# Patient Record
Sex: FEMALE | Race: WHITE
[De-identification: names, ages, dates, MRNs, and addresses within clinical notes are randomized per-mention and may not be internally consistent; named-entity substitution may affect disease eponyms.]

---

## 2020-01-17 ENCOUNTER — HOSPITAL ENCOUNTER (EMERGENCY)
Dept: HOSPITAL 92 - ERS | Age: 75
Discharge: HOME | End: 2020-01-17
Payer: MEDICARE

## 2020-01-17 DIAGNOSIS — R53.1: ICD-10-CM

## 2020-01-17 DIAGNOSIS — E78.00: ICD-10-CM

## 2020-01-17 DIAGNOSIS — F17.210: ICD-10-CM

## 2020-01-17 DIAGNOSIS — Z79.899: ICD-10-CM

## 2020-01-17 DIAGNOSIS — I12.9: Primary | ICD-10-CM

## 2020-01-17 DIAGNOSIS — E78.5: ICD-10-CM

## 2020-01-17 DIAGNOSIS — N18.9: ICD-10-CM

## 2020-01-17 LAB
ALBUMIN SERPL BCG-MCNC: 2.5 G/DL (ref 3.4–4.8)
ALP SERPL-CCNC: 61 U/L (ref 40–110)
ALT SERPL W P-5'-P-CCNC: 10 U/L (ref 8–55)
ANION GAP SERPL CALC-SCNC: 15 MMOL/L (ref 10–20)
AST SERPL-CCNC: 41 U/L (ref 5–34)
BASOPHILS # BLD AUTO: 0.2 THOU/UL (ref 0–0.2)
BASOPHILS NFR BLD AUTO: 2.7 % (ref 0–1)
BILIRUB SERPL-MCNC: 0.5 MG/DL (ref 0.2–1.2)
BUN SERPL-MCNC: 21 MG/DL (ref 9.8–20.1)
CALCIUM SERPL-MCNC: 7.9 MG/DL (ref 7.8–10.44)
CHLORIDE SERPL-SCNC: 104 MMOL/L (ref 98–107)
CO2 SERPL-SCNC: 20 MMOL/L (ref 23–31)
CREAT CL PREDICTED SERPL C-G-VRATE: 0 ML/MIN (ref 70–130)
EOSINOPHIL # BLD AUTO: 0.3 THOU/UL (ref 0–0.7)
EOSINOPHIL NFR BLD AUTO: 4.4 % (ref 0–10)
GLOBULIN SER CALC-MCNC: 3.1 G/DL (ref 2.4–3.5)
GLUCOSE SERPL-MCNC: 85 MG/DL (ref 83–110)
HGB BLD-MCNC: 11.1 G/DL (ref 12–16)
LYMPHOCYTES # BLD: 1 THOU/UL (ref 1.2–3.4)
LYMPHOCYTES NFR BLD AUTO: 15.5 % (ref 21–51)
MACROCYTES BLD QL SMEAR: (no result) (100X)
MCH RBC QN AUTO: 35.8 PG (ref 27–31)
MCV RBC AUTO: 108 FL (ref 78–98)
MDIFF COMPLETE?: YES
MONOCYTES # BLD AUTO: 0.3 THOU/UL (ref 0.11–0.59)
MONOCYTES NFR BLD AUTO: 5 % (ref 0–10)
NEUTROPHILS # BLD AUTO: 4.9 THOU/UL (ref 1.4–6.5)
NEUTROPHILS NFR BLD AUTO: 72.5 % (ref 42–75)
PLATELET # BLD AUTO: 86 THOU/UL (ref 130–400)
POTASSIUM SERPL-SCNC: 3.5 MMOL/L (ref 3.5–5.1)
RBC # BLD AUTO: 3.09 MILL/UL (ref 4.2–5.4)
SODIUM SERPL-SCNC: 135 MMOL/L (ref 136–145)
WBC # BLD AUTO: 6.7 THOU/UL (ref 4.8–10.8)

## 2020-01-17 PROCEDURE — 85025 COMPLETE CBC W/AUTO DIFF WBC: CPT

## 2020-01-17 PROCEDURE — 83880 ASSAY OF NATRIURETIC PEPTIDE: CPT

## 2020-01-17 PROCEDURE — 36415 COLL VENOUS BLD VENIPUNCTURE: CPT

## 2020-01-17 PROCEDURE — 81003 URINALYSIS AUTO W/O SCOPE: CPT

## 2020-01-17 PROCEDURE — 80053 COMPREHEN METABOLIC PANEL: CPT

## 2020-01-17 PROCEDURE — 84484 ASSAY OF TROPONIN QUANT: CPT

## 2020-01-17 PROCEDURE — 71045 X-RAY EXAM CHEST 1 VIEW: CPT

## 2020-01-17 PROCEDURE — 93005 ELECTROCARDIOGRAM TRACING: CPT

## 2020-01-17 NOTE — RAD
XR Chest 1 View Portable



HISTORY: Weakness and lethargy



COMPARISON: 9/2/2016



FINDINGS: The heart size is normal. The lungs are well expanded without focal areas of consolidation,
 pneumothorax or pleural effusions.



IMPRESSION: No radiographic evidence of acute cardiopulmonary process.



Reported By: Maximiliano Meyer 

Electronically Signed:  1/17/2020 7:56 AM

## 2020-01-23 ENCOUNTER — HOSPITAL ENCOUNTER (INPATIENT)
Dept: HOSPITAL 92 - ERS | Age: 75
LOS: 15 days | DRG: 840 | End: 2020-02-07
Attending: FAMILY MEDICINE | Admitting: FAMILY MEDICINE
Payer: MEDICARE

## 2020-01-23 ENCOUNTER — HOSPITAL ENCOUNTER (EMERGENCY)
Dept: HOSPITAL 9 - MADERS | Age: 75
Discharge: ANOTHER HOSPITAL | End: 2020-01-23
Payer: MEDICARE

## 2020-01-23 VITALS — BODY MASS INDEX: 24.3 KG/M2

## 2020-01-23 DIAGNOSIS — R59.1: ICD-10-CM

## 2020-01-23 DIAGNOSIS — E78.5: ICD-10-CM

## 2020-01-23 DIAGNOSIS — Z51.5: ICD-10-CM

## 2020-01-23 DIAGNOSIS — C50.919: ICD-10-CM

## 2020-01-23 DIAGNOSIS — D69.6: ICD-10-CM

## 2020-01-23 DIAGNOSIS — E87.2: ICD-10-CM

## 2020-01-23 DIAGNOSIS — Z87.891: ICD-10-CM

## 2020-01-23 DIAGNOSIS — A41.9: ICD-10-CM

## 2020-01-23 DIAGNOSIS — R62.7: ICD-10-CM

## 2020-01-23 DIAGNOSIS — J96.01: ICD-10-CM

## 2020-01-23 DIAGNOSIS — N18.3: ICD-10-CM

## 2020-01-23 DIAGNOSIS — D63.1: ICD-10-CM

## 2020-01-23 DIAGNOSIS — I95.9: ICD-10-CM

## 2020-01-23 DIAGNOSIS — I62.00: ICD-10-CM

## 2020-01-23 DIAGNOSIS — F17.210: ICD-10-CM

## 2020-01-23 DIAGNOSIS — Z90.710: ICD-10-CM

## 2020-01-23 DIAGNOSIS — R53.81: ICD-10-CM

## 2020-01-23 DIAGNOSIS — Z79.899: ICD-10-CM

## 2020-01-23 DIAGNOSIS — I12.9: ICD-10-CM

## 2020-01-23 DIAGNOSIS — N17.9: ICD-10-CM

## 2020-01-23 DIAGNOSIS — I21.4: Primary | ICD-10-CM

## 2020-01-23 DIAGNOSIS — D53.9: ICD-10-CM

## 2020-01-23 DIAGNOSIS — E43: ICD-10-CM

## 2020-01-23 DIAGNOSIS — C85.11: Primary | ICD-10-CM

## 2020-01-23 DIAGNOSIS — I10: ICD-10-CM

## 2020-01-23 DIAGNOSIS — E87.6: ICD-10-CM

## 2020-01-23 DIAGNOSIS — J15.6: ICD-10-CM

## 2020-01-23 DIAGNOSIS — Z79.01: ICD-10-CM

## 2020-01-23 DIAGNOSIS — I48.0: ICD-10-CM

## 2020-01-23 LAB
ALBUMIN SERPL BCG-MCNC: 2.8 G/DL (ref 3.4–4.8)
ALP SERPL-CCNC: 65 U/L (ref 40–110)
ALT SERPL W P-5'-P-CCNC: 17 U/L (ref 8–55)
ANION GAP SERPL CALC-SCNC: 22 MMOL/L (ref 10–20)
AST SERPL-CCNC: 49 U/L (ref 5–34)
BACTERIA UR QL AUTO: (no result) HPF
BILIRUB SERPL-MCNC: 0.5 MG/DL (ref 0.2–1.2)
BUN SERPL-MCNC: 29 MG/DL (ref 9.8–20.1)
CALCIUM SERPL-MCNC: 8.9 MG/DL (ref 7.8–10.44)
CHLORIDE SERPL-SCNC: 101 MMOL/L (ref 98–107)
CK MB SERPL-MCNC: 1.8 NG/ML (ref 0–6.6)
CK MB SERPL-MCNC: 2.1 NG/ML (ref 0–6.6)
CO2 SERPL-SCNC: 16 MMOL/L (ref 23–31)
CREAT CL PREDICTED SERPL C-G-VRATE: 0 ML/MIN (ref 70–130)
GLOBULIN SER CALC-MCNC: 3.1 G/DL (ref 2.4–3.5)
GLUCOSE SERPL-MCNC: 99 MG/DL (ref 83–110)
HGB BLD-MCNC: 11.6 G/DL (ref 12–16)
INR PPP: 1
MACROCYTES BLD QL SMEAR: (no result) (100X)
MAGNESIUM SERPL-MCNC: 1.9 MG/DL (ref 1.6–2.6)
MCH RBC QN AUTO: 33.2 PG (ref 27–31)
MCV RBC AUTO: 104.1 FL (ref 78–98)
MDIFF COMPLETE?: YES
PLATELET # BLD AUTO: 71 THOU/UL (ref 130–400)
POLYCHROMASIA BLD QL SMEAR: (no result) (100X)
POTASSIUM SERPL-SCNC: 4.1 MMOL/L (ref 3.5–5.1)
PROT UR STRIP.AUTO-MCNC: 30 MG/DL
PROTHROMBIN TIME: 13.5 SEC (ref 12–14.7)
RBC # BLD AUTO: 3.48 MILL/UL (ref 4.2–5.4)
SODIUM SERPL-SCNC: 135 MMOL/L (ref 136–145)
WBC # BLD AUTO: 9.4 THOU/UL (ref 4.8–10.8)
WBC UR QL AUTO: (no result) HPF (ref 0–3)

## 2020-01-23 PROCEDURE — 80048 BASIC METABOLIC PNL TOTAL CA: CPT

## 2020-01-23 PROCEDURE — 96361 HYDRATE IV INFUSION ADD-ON: CPT

## 2020-01-23 PROCEDURE — 82553 CREATINE MB FRACTION: CPT

## 2020-01-23 PROCEDURE — 70450 CT HEAD/BRAIN W/O DYE: CPT

## 2020-01-23 PROCEDURE — 82274 ASSAY TEST FOR BLOOD FECAL: CPT

## 2020-01-23 PROCEDURE — 36416 COLLJ CAPILLARY BLOOD SPEC: CPT

## 2020-01-23 PROCEDURE — 85610 PROTHROMBIN TIME: CPT

## 2020-01-23 PROCEDURE — 93306 TTE W/DOPPLER COMPLETE: CPT

## 2020-01-23 PROCEDURE — 83735 ASSAY OF MAGNESIUM: CPT

## 2020-01-23 PROCEDURE — 80053 COMPREHEN METABOLIC PANEL: CPT

## 2020-01-23 PROCEDURE — 84443 ASSAY THYROID STIM HORMONE: CPT

## 2020-01-23 PROCEDURE — 85025 COMPLETE CBC W/AUTO DIFF WBC: CPT

## 2020-01-23 PROCEDURE — 74177 CT ABD & PELVIS W/CONTRAST: CPT

## 2020-01-23 PROCEDURE — A9579 GAD-BASE MR CONTRAST NOS,1ML: HCPCS

## 2020-01-23 PROCEDURE — 82378 CARCINOEMBRYONIC ANTIGEN: CPT

## 2020-01-23 PROCEDURE — 84484 ASSAY OF TROPONIN QUANT: CPT

## 2020-01-23 PROCEDURE — 71045 X-RAY EXAM CHEST 1 VIEW: CPT

## 2020-01-23 PROCEDURE — 94640 AIRWAY INHALATION TREATMENT: CPT

## 2020-01-23 PROCEDURE — 36415 COLL VENOUS BLD VENIPUNCTURE: CPT

## 2020-01-23 PROCEDURE — 85007 BL SMEAR W/DIFF WBC COUNT: CPT

## 2020-01-23 PROCEDURE — C9113 INJ PANTOPRAZOLE SODIUM, VIA: HCPCS

## 2020-01-23 PROCEDURE — 83880 ASSAY OF NATRIURETIC PEPTIDE: CPT

## 2020-01-23 PROCEDURE — 83605 ASSAY OF LACTIC ACID: CPT

## 2020-01-23 PROCEDURE — 96374 THER/PROPH/DIAG INJ IV PUSH: CPT

## 2020-01-23 PROCEDURE — 96372 THER/PROPH/DIAG INJ SC/IM: CPT

## 2020-01-23 PROCEDURE — 82533 TOTAL CORTISOL: CPT

## 2020-01-23 PROCEDURE — 86901 BLOOD TYPING SEROLOGIC RH(D): CPT

## 2020-01-23 PROCEDURE — 85060 BLOOD SMEAR INTERPRETATION: CPT

## 2020-01-23 PROCEDURE — 93005 ELECTROCARDIOGRAM TRACING: CPT

## 2020-01-23 PROCEDURE — 88184 FLOWCYTOMETRY/ TC 1 MARKER: CPT

## 2020-01-23 PROCEDURE — 87040 BLOOD CULTURE FOR BACTERIA: CPT

## 2020-01-23 PROCEDURE — 86300 IMMUNOASSAY TUMOR CA 15-3: CPT

## 2020-01-23 PROCEDURE — 86900 BLOOD TYPING SEROLOGIC ABO: CPT

## 2020-01-23 PROCEDURE — 81001 URINALYSIS AUTO W/SCOPE: CPT

## 2020-01-23 PROCEDURE — 74018 RADEX ABDOMEN 1 VIEW: CPT

## 2020-01-23 PROCEDURE — 36430 TRANSFUSION BLD/BLD COMPNT: CPT

## 2020-01-23 PROCEDURE — 82607 VITAMIN B-12: CPT

## 2020-01-23 PROCEDURE — 87086 URINE CULTURE/COLONY COUNT: CPT

## 2020-01-23 PROCEDURE — 82140 ASSAY OF AMMONIA: CPT

## 2020-01-23 PROCEDURE — 84145 PROCALCITONIN (PCT): CPT

## 2020-01-23 PROCEDURE — 85027 COMPLETE CBC AUTOMATED: CPT

## 2020-01-23 PROCEDURE — 82746 ASSAY OF FOLIC ACID SERUM: CPT

## 2020-01-23 PROCEDURE — 70553 MRI BRAIN STEM W/O & W/DYE: CPT

## 2020-01-23 PROCEDURE — P9047 ALBUMIN (HUMAN), 25%, 50ML: HCPCS

## 2020-01-23 PROCEDURE — 84100 ASSAY OF PHOSPHORUS: CPT

## 2020-01-23 PROCEDURE — 84439 ASSAY OF FREE THYROXINE: CPT

## 2020-01-23 PROCEDURE — 86850 RBC ANTIBODY SCREEN: CPT

## 2020-01-23 PROCEDURE — P9016 RBC LEUKOCYTES REDUCED: HCPCS

## 2020-01-23 PROCEDURE — 81003 URINALYSIS AUTO W/O SCOPE: CPT

## 2020-01-23 PROCEDURE — 71250 CT THORAX DX C-: CPT

## 2020-01-23 PROCEDURE — 81015 MICROSCOPIC EXAM OF URINE: CPT

## 2020-01-23 PROCEDURE — P9035 PLATELET PHERES LEUKOREDUCED: HCPCS

## 2020-01-23 NOTE — CT
Exam: Head CT without contrast



HISTORY: Intracranial hemorrhage. Status post fall.



COMPARISON: 1/23/2020



FINDINGS:

Hemorrhage: Redemonstration of an extra-axial hematoma along the left frontal convexity, unchanged an
d measuring 0.7 cm. Overlying scalp hematoma is noted. No new parenchymal hemorrhage.

Brain parenchyma: Stable atrophy. Cortical gray-white matter differentiation is preserved.Chronic sma
ll vessel ischemic changes of the white matter are redemonstrated.

Ventricular system: Stable

Calvarium: Intact.

Sinuses and mastoid air cells: Unchanged



IMPRESSION:

Essentially stable small extra-axial hematoma along the left frontal convexity. No significant change
 in size. No evidence of new intracranial hemorrhage. Continued surveillance is recommended.







Transcribed Date/Time: 1/23/2020 11:28 PM



Reported By: Bibi Maher 

Electronically Signed:  1/23/2020 11:48 PM

## 2020-01-23 NOTE — RAD
CHEST ONE VIEW:

1/23/20

 

INDICATION:

History of cough. 

 

COMPARISON: 

Prior exam dated 1/17/20.

 

FINDINGS: 

The heart size is normal. Vascular calcifications of the aortic arch is similar in appearance. Pulmon
jed vasculature is within normal limits. There is a new small right pleural effusion. No acute osseou
s abnormality evident.

 

IMPRESSION: 

New small right pleural effusion. No clover consolidation evident. 

 

POS: [facility]

## 2020-01-23 NOTE — CT
Exam: Chest CT without contrast

Abdomen CT without contrast

Pelvic CT without contrast



HISTORY: Diffuse epigastric pain, nausea and abdominal distention. Supraclavicular lymph nodes. Dyspn
ea



COMPARISON: Abdomen pelvis CT 5/24/2017



FINDINGS:

Chest CT:

Limited evaluation of the mediastinum due to the lack of IV contrast. Enlarged right paratracheal lym
ph node measures 1.9 x 0.8 cm. No masses or hematomas. Heart size is normal. No significant

pericardial fluid.

Atherosclerosis of the visualized thoracic and abdominal aorta. No periaortic fat stranding.

There is a large, necrotic right supraclavicular lymph node measuring 2.4 x 4.1 cm. Additional right 
axillary lymph nodes are noted. Representative lymph node measures 1.6 x 1.4 cm. No significant

left axillary lymphadenopathy. There does appear to be diffuse edema involving the right breast. Karol
elate for possible inflammatory breast cancer.

Trachea and central bronchi are patent. Small right-sided pleural effusion. Dependent atelectatic elizabeth
nges in the right lower lobe are suspected. Component of aspiration or pneumonia cannot be

excluded. No suspicious masses, nodules or consolidation. In the left lung, nonspecific groundglass o
pacities in the apex. Linear opacities in the left lower lobe may represent areas of scar or

atelectasis. Ill-defined nodule in the left lower lobe measures 0.6 x 0.3 cm.

Questionable right hilar lymphadenopathy measuring 1.4 x 1.2 cm.



Abdomen CT: 

Limited evaluation of the solid organs by the lack of IV contrast. Grossly the liver, spleen, pancrea
s and left adrenal gland is unremarkable. Possible hyperdense nodule involving the right adrenal

gland measuring 0.9 cm with an attenuation coefficient of 36 Hounsfield units. There does appear to b
e multiple lymph nodes along the gastrohepatic ligament. Representative lymph node measures 0.8 x

1.3 cm. Additional peripancreatic lymph nodes may be present. There appears to be lymphadenopathy ant
erior to the aorta and posterior to the pancreas, measuring 3.0 x 1.9 cm

Peripherally calcified exophytic hypodense lesion in the right kidney, measures 1.8 cm. Bilaterally n
o obstructive uropathy.

There is perihepatic and perisplenic fluid. There is fluid in both paracolic gutters and mild edema t
hroughout the mesentery. No evidence of free air.

Limited evaluation of the alimentary canal by the lack of oral contrast. No evidence of bowel obstruc
tion. Limited evaluation of the ileocecal junction. Normal caliber appendix. Ileocecal junction is

unremarkable. Limited evaluation of the colon due to inadequate distention. Mucosal thickening may be
 due to inadequate distention. Nonemergent colonoscopy is recommended. There is diverticulosis,

without evidence of diverticulitis.



Pelvic CT: 

There is complex fluid in the pelvis. Uterus is surgically absent. Urinary bladder is unremarkable. T
here is evidence of edema in the soft tissues, worrisome for anasarca in the thorax and abdomen and

pelvis. There appears be lymphadenopathy along the right common iliac chain measuring 1.3 x 2.4 cm. A
dditional lymphadenopathy along the right external iliac chain is noted.



Osseous structures: 

No definite osteolytic or osteoblastic lesions.



IMPRESSION:

1. Extensive lymphadenopathy in the chest, abdomen and pelvis as described above.

2. Edematous change in the right breast. Correlate for inflammatory breast cancer.

3. Small right-sided pleural effusion.



Results of the head CT, chest/abdomen and pelvic CT discussed with Dr. Chester, 1/23/2020 at 7:54 PM



Code CR



Transcribed Date/Time: 1/23/2020 8:05 PM



Reported By: Bibi Maher 

Electronically Signed:  1/23/2020 8:05 PM

## 2020-01-23 NOTE — CT
Exam: Head CT without contrast





HISTORY: Dizziness and weakness, x2 weeks.



COMPARISON: none





FINDINGS:

Hemorrhage: 0.7 cm extra-axial hematoma along the left frontal convex city. No parenchymal hemorrhage
. Minimal mass effect upon the left frontal lobe.

Brain parenchyma: Cortical gray-white matter differentiation is preserved. No significant mass effect
 or midline shift. Basilar cisterns are patent.Chronic small vessel ischemic changes white matter.

Remote lacunar infarct in the left thalamus.

Ventricular system: Ventricles and sulci are patent and symmetric.

Calvarium: Intact. Large left frontal scalp hematoma.

Sinuses and mastoid air cells: Adequate aeration.



IMPRESSION:



1. Left scalp hematoma. No calvarial fracture

2. Left frontal extra-axial hematoma with minimal mass effect upon the left frontal lobe.







Reported By: Bibi Maher 

Electronically Signed:  1/23/2020 7:44 PM

## 2020-01-24 RX ADMIN — Medication SCH: at 21:08

## 2020-01-24 NOTE — PDOC.HOSPP
- Subjective


Subjective: 


Lying down comfortably in bed. Denies any headache, visual changes, focal 

changes. States lymph nodes started becoming enlarged on neck for the last 

couple of weeks.





- Objective


Vital Signs & Weight: 


 Vital Signs (12 hours)











  Temp


 


 01/24/20 08:00  98.1 F


 


 01/24/20 04:00  98.0 F


 


 01/24/20 02:15  97.7 F








 Weight











Weight                         145 lb 4.554 oz











 Most Recent Monitor Data











Heart Rate from ECG            67


 


NIBP                           106/63


 


NIBP BP-Mean                   77


 


Respiration from ECG           21


 


SpO2                           96














I&O: 


 











 01/23/20 01/24/20 01/25/20





 06:59 06:59 06:59


 


Intake Total  295 280


 


Output Total  0 380


 


Balance  295 -100











Additional Labs: 





 Laboratory Results











  01/23/20





  22:54


 


CK-MB (CK-2)  2.1


 


Troponin I  0.122 H











Radiology Reviewed by me: Yes


EKG Reviewed by me: Yes





Hospitalist ROS





- Review of Systems


Constitutional: denies: fever, chills, sweats, weakness, malaise, other


Eyes: denies: pain, vision change, conjunctivae inflammation, eyelid 

inflammation, redness, other


ENT: denies: ear pain, ear discharge, nose pain, nose discharge, nose congestion

, mouth pain, mouth swelling, throat pain, throat swelling, other


Respiratory: denies: cough, dry, shortness of breath, hemoptysis, SOB with 

excertion, pleuritic pain, sputum, wheezing, other


Cardiovascular: denies: chest pain, palpitations, orthopnea, paroxysmal noc. 

dyspnea, edema, light headedness, other


Gastrointestinal: denies: nausea, vomiting, abdominal pain, diarrhea, 

constipation, melena, hematochezia, other


Genitourinary: denies: dysuria, frequency, incontinence, hematuria, retention, 

other


Musculoskeletal: denies: neck pain, shoulder pain, arm pain, back pain, hand 

pain, leg pain, foot pain, other





- Medication


Medications: 


Active Medications











Generic Name Dose Route Start Last Admin





  Trade Name Freq  PRN Reason Stop Dose Admin


 


Famotidine  20 mg  01/24/20 09:00  01/24/20 09:25





  Pepcid  PO   20 mg





  QAM TINO   Administration





     





     





     





     


 


Sodium Chloride  1,000 mls @ 100 mls/hr  01/24/20 03:35  01/24/20 03:57





  Normal Saline 0.9%  IV   Not Given





  .Q10H [facility]   





     





     





     





     








 











 Medication  Instructions  Recorded  Confirmed  Type


 


Amlodipine Besylate [amLODIPine 10 mg PO DAILY 09/02/16 01/24/20 History





Besylate]    


 


Lovastatin 20 mg PO QAM-WM 09/02/16 01/24/20 History


 


Metoprolol Tartrate 25 mg PO BID 09/02/16 01/24/20 History


 


cloNIDine HCl 0.2 mg PO BID 09/02/16 01/24/20 History


 


Omega-3 Fatty Acids/Fish Oil [Fish 1 cap PO DAILY 01/24/20 01/24/20 History





Oil 1,000 mg Capsule]    


 


hydrALAZINE [Apresoline] 25 mg PO TID 01/24/20 01/24/20 History














- Exam


General Appearance: NAD, awake alert


Eye: PERRL, anicteric sclera


ENT: normocephalic atraumatic, no oropharyngeal lesions, moist mucosa


Neck: supple, symmetric, no JVD, no thyromegaly, no carotid bruit


Neck - other findings: Palpable bilateraly lymphadenopathy in cervical superior 

and posterior radha


Heart: RRR, no murmur, no gallops, no rubs, normal peripheral pulses


Respiratory: CTAB, no wheezes, no rales, no ronchi, normal chest expansion, no 

tachypnea, normal percussion


Gastrointestinal: soft, non-tender, non-distended, normal bowel sounds, no 

palpable masses, no hepatomegaly, no splenomegaly, no bruit


Extremities: no cyanosis, no clubbing, no edema


Skin: normal turgor, no lesions, no rashes


Neurological: cranial nerve grossly intact, normal sensation to touch, no 

weakness, no focal deficits, no new deficit


Musculoskeletal: normal tone, normal strength, no muscle wasting


Psychiatric: normal affect, normal behavior, A&O x 3





Hosp A/P


(1) Subdural hemorrhage


Code(s): I62.00 - NONTRAUMATIC SUBDURAL HEMORRHAGE, UNSPECIFIED   Status: Acute

   





(2) Elevated troponin


Code(s): R79.89 - OTHER SPECIFIED ABNORMAL FINDINGS OF BLOOD CHEMISTRY   Status

: Acute   


Plan: 


Likely related to renal failure, no active chest pain at this time








(3) Lymphadenopathy


Code(s): R59.1 - GENERALIZED ENLARGED LYMPH NODES   Status: Acute   


Plan: 


New onset for the past few weeks, located along chest, abdomen, and cervical 

chains (CT RECORD IN THE CHART)








(4) Acute kidney injury superimposed on CKD


Code(s): N17.9 - ACUTE KIDNEY FAILURE, UNSPECIFIED; N18.9 - CHRONIC KIDNEY 

DISEASE, UNSPECIFIED   Status: Acute   





(5) Anemia in chronic kidney disease


Code(s): N18.9 - CHRONIC KIDNEY DISEASE, UNSPECIFIED; D63.1 - ANEMIA IN CHRONIC 

KIDNEY DISEASE   Status: Acute   





(6) Hypertension


Code(s): I10 - ESSENTIAL (PRIMARY) HYPERTENSION   Status: Chronic   





(7) Atrial fibrillation with RVR


Code(s): I48.91 - UNSPECIFIED ATRIAL FIBRILLATION   Status: Acute   





- Plan


Outside Ct scan for Gretchen reviewed by me, shows extensive lymphadenopathy in 

the chest, abdomen, and pelvis. Will see if this report can be uploaded to our 

record system


There is mention of edematous change in the right breast, which is concerning 

for inflammatory breast CA.


CT head showsing extra-axial 0.7cm hematoma


Pending further evaluation by oncology and neurosurgery.


Will likely need some sort lymph node biopsy, but given her recent hematoma on 

CT head will wait and continue to monitor


Resume home amlodipine


Continue IVF


Digoxin given for afib, consult to cardiology for further reccs.





Disposition: Continue monitoring in CCU. Appreciate reccs from neurosurg and 

oncology.

## 2020-01-24 NOTE — CON
DATE OF CONSULTATION:  01/24/2020



This is a 70 minutes time, of that time, greater than 50% was spent with the patient

and/or the patient's unit in the hospital. 



HISTORY OF PRESENT ILLNESS:  This is a 74-year-old female, who was admitted

yesterday with increasing weakness and had a knot on her left temporal area.  She

does not remember falling and actually does not have any bruises around the area.

She says she has been feeling weak for 2 to 3 weeks.  She had a CT scan done showing

diffuse adenopathy in her abdomen, a possible suppurative breast mass on the right. 



PAST MEDICAL HISTORY:  

1. Chronic kidney disease.

2. Ischemic colitis.

3. Deconditioning.

4. Diverticulosis.

5. Hyperlipidemia.

6. Hypertension.



PAST SURGICAL HISTORY:  Hysterectomy.



MEDICATIONS:  Prior to admission; 

1. Amlodipine.

2. Klonopin.

3. Lovastatin.

4. Metoprolol.

5. Protonix.



ALLERGIES:  ACE INHIBITORS.



FAMILY MEDICAL HISTORY:  Unremarkable.



SOCIAL HISTORY:  Quit smoking 2 weeks ago.  Has history of marijuana and

methamphetamine use in the past, not currently using. 



REVIEW OF SYSTEMS:  Remarkable for weakness.  No nausea, vomiting, hematemesis,

melena, hematochezia, hematuria, or dysuria. 



PHYSICAL EXAMINATION:

VITAL SIGNS:  Temperature 98.1, pulse 70, blood pressure 106/63, and O2 saturation

96%. 

HEENT:  She has a golf ball size knot in left occipital-temporal region. 

NECK:  No adenopathy or JVD.  Pupils are reactive.  Tongue midline. 

LUNGS:  Clear. 

CARDIAC:  S1 and S2.  Regular. 

ABDOMEN:  Soft. 

EXTREMITIES:  No edema.  Moves all 4 extremities without difficulty.



LABORATORY DATA:  Troponin 0.122.  White blood cell count 9.4, hematocrit 36.2, and

platelet count 71.  INR 1.0.  Sodium 135, potassium 4.1, chloride 101, CO2 of 16,

anion gap 18, BUN 29, creatinine 2.1, glucose 99, AST 49, and ALT 17.  Troponin

0.122.  Albumin 2.8.  Urinalysis shows some ketones in the urine. 



ASSESSMENT:  

1. Possible fall with injury to the head with small subdural hematoma.

2. Diffuse lymphadenopathy.

3. Thrombocytopenia.

4. Mild metabolic acidosis.

5. Severe protein-calorie malnutrition.



PLAN:  Basically supportive with further workup per Internal Medicine.  From my

standpoint, she can transfer out to the floor.  No pulmonary recommendations at this

time. 







Job ID:  [id number]

## 2020-01-24 NOTE — CON
DATE OF CONSULTATION:  



HISTORY OF PRESENT ILLNESS:  A 74-year-old female presented with a history of

several weeks of increasing weakness, fatigue, and weight loss.  In the outside

emergency department, she was found to have diffuse lymphadenopathy with concern for

inflammatory breast cancer found on CT.  She was additionally noted to have a

subdural collection on head CT, and Neurosurgery has been consulted.  They seem to

believe this is not a subdural hematoma and is rather metastatic changes.  We were

consulted for management of atrial fibrillation.  Today, the patient has been in

atrial fibrillation with RVR with rate up to 138 recorded.  She is currently in

normal sinus rhythm and seems to fluctuate.  Current rate is 70.  She was given one

dose of digoxin earlier today in addition to a 500 mL bolus.  The patient denies any

feelings of palpitations, chest pain, nausea, vomiting, difficulty breathing, or

shortness of breath.  She notes feeling tired and weak. 



PAST MEDICAL HISTORY:  Difficult to obtain from patient, but on review of medical

records, 

1. Hypertension.

2. Hyperlipidemia.

3. Deconditioning.

4. Chronic kidney disease.

5. History of ischemic colitis.

6. Peripheral vascular disease.



SURGICAL HISTORY:  Hysterectomy.



ALLERGIES:  ACE INHIBITORS.



FAMILY HISTORY:  Unremarkable.



SOCIAL HISTORY:  Prior smoker, quit 3 weeks ago.  Former marijuana and

methamphetamine use.  Denies alcohol use. 



REVIEW OF SYSTEMS:  Noted as above, reviewed and otherwise negative.



PHYSICAL EXAMINATION:

VITAL SIGNS:  Blood pressure 87/54, heart rate 70, respiratory rate 20, O2

saturation 95% on room air. 

GENERAL:  Well-developed woman, in no acute distress, weak. 

HEENT:  Normocephalic, atraumatic, lymphadenopathy palpable. 

HEART:  Regular rate and rhythm, no murmur. 

RESPIRATORY:  Clear to auscultation bilaterally. 

EXTREMITIES:  No peripheral edema or cyanosis.



LABORATORY:  BNP of 251, troponin 0.122, creatinine 2.11.



ASSESSMENT AND PLAN:  Paroxysmal atrial fibrillation.  The patient appears to be

intermittently going in and out of atrial fibrillation.  Continue metoprolol b.i.d.

In addition, we will add Multaq twice daily as well.  I will continue to monitor for

hypotension.  Echocardiogram has just been completed and will be reviewed.  In

regard to anticoagulation, we will not start at this time considering questionable

subdural collection.  We will continue to follow the patient throughout

hospitalization. 



This patient was seen and evaluated by Dr. Wise and is in agreement and plan as

noted above. 







Job ID:  [id number]

## 2020-01-24 NOTE — CON
DATE OF CONSULTATION:  



REASON FOR CONSULTATION:  Atrial fibrillation.



HISTORY OF PRESENT ILLNESS:  Please refer to Dr. Tessie Cedillo's for full

consultation for details.  Briefly, Ms. Gleason re-presented with weakness and

fatigue.  She was found to have likely dural mass present.  This was initially felt

to be consistent with a subdural hematoma, but was refuted by Neurosurgery. 



She was also found to have significant lymphadenopathy and likely breast cancer.

She has developed paroxysmal atrial fibrillation.  During my visit, she was in sinus

rhythm. 



PHYSICAL EXAMINATION:

GENERAL:  Patient is a pleasant woman, who is in no acute distress.  The patient

appears her stated age. 

VITAL SIGNS:  Blood pressure 96/56, pulse 68, temperature afebrile. 

NEUROLOGIC:  The patient is alert and oriented x3 with no focal neurologic deficits. 

HEENT:  Sclerae without icterus.  Mouth has moist mucous membranes with normal

pallor. 

NECK:  No JVD.  Carotid upstroke brisk.  No bruits bilaterally. 

LUNGS:  Clear to auscultation with unlabored respirations. 

BACK:  No scoliosis or kyphosis. 

CARDIAC:  Regular rate and rhythm with normal S1 and S2.  No S3 or S4 noted.  No

significant rubs, murmurs, thrills, or gallops noted throughout the precordium.  PMI

is not displaced.  There is no parasternal heave. 

ABDOMEN:  Soft, nontender, nondistended.  No peritoneal signs present.  No

hepatosplenomegaly.  No abnormal striae. 

EXTREMITIES:  2+ femoral and 2+ dorsalis pedis pulses.  No cyanosis, clubbing, or

edema. 

SKIN:  No gross abnormalities.



LABORATORY DATA:  Peak troponin 0.12.



IMPRESSION:  

1. Paroxysmal atrial fibrillation.

2. Widespread cancer of unknown etiology, likely breast.

3. Elevated troponin.



RECOMMENDATIONS:  Ms. Gleason's increased troponin likely secondary to demand

ischemia.  This is a type 2 MI.  We would recommend Multaq 400 mg p.o. b.i.d.  We

will hold anticoagulation therapy until further discussions with Dr. Wallace.

Prognosis does appear poor given the above. 







Job ID:  [id number]

## 2020-01-24 NOTE — CON
DATE OF CONSULTATION:  1/24/2020



REASON FOR CONSULT:  Lymphadenopathy and possible breast cancer.



HISTORY OF PRESENT ILLNESS:  Ms. Gleason is a pleasant 74-year-old female, who

presented to the [place] ER with generalized weakness.  She underwent a 
chest,

abdomen, and pelvis in the ER.  She had an enlarged right paratracheal lymph 
node

measuring 1.9 x 0.8 cm.  There was a right necrotic supraclavicular lymph node

measuring 2.4 x 4.1 cm.  She had right axillary lymph nodes.  She had diffuse 
edema

involving her right breast.  She had a small left lower lobe nodule, some right

hilar lymphadenopathy.  She also had lymphadenopathy throughout her abdomen and

pelvis.  She underwent a brain CT as she had a palpable nodule on her left 
scalp.

This was a small extra-axial hematoma along the left frontal convexity.  She was

admitted here for hematoma and generalized weakness.  Neurosurgery was 
consulted.

The patient states she did hit her head six or seven months ago on a corner of 
her

bed, and she thinks that is where her knot on her head came from.  She admits 
that

her breast has been hurting her for the last month or so.  She quit smoking 
three

weeks ago because she did not like the taste of it any longer.  She states she 
has

lost about 7 pounds in the last several months.  Her last mammogram was five 
years

ago.  She has had a colonoscopy in the past, she does not remember how long ago 
that

was.  The patient is in the ICU on evaluation.  She denies any complaints at 
this

time other than weakness. 



PAST MEDICAL HISTORY:  

1. Chronic kidney disease.

2. History of colitis.

3. Diverticulitis.

4. Hyperlipidemia.

5. Hypertension.

6. History of alcohol use.

7. History of tobacco use.



PAST SURGICAL HISTORY:  Hysterectomy.



ALLERGIES:  TO ACE INHIBITORS.



HOME MEDICATIONS:  

1. Amlodipine.

2. Clonidine.

3. Hydralazine.

4. Lovastatin.

5. Metoprolol.



FAMILY HISTORY:  No history of cancer in her immediate family.



SOCIAL HISTORY:  Single, has 4 children.  She says she is estranged from them.  
Quit

smoking three weeks ago.  Quit drinking alcohol 3 years ago.  No illicit drug 
use. 



REVIEW OF SYSTEMS:  A 10-point review of systems is negative except for noted 
in HPI.



PHYSICAL EXAMINATION:

VITAL SIGNS:  Temperature is 98.0, pulse is 70, respiratory rate 20, blood 
pressure

is 87/54.  She is 95% on 2L. 

GENERAL:  This is an elderly-appearing female in no acute distress. 

HEENT:  She has a left frontal mass.  Her pupils are equal and reactive to 
light. 

NECK:  She has palpable supraclavicular lymph nodes in right neck region. 

CV:  Regular rate and rhythm. 

LUNGS:  Clear anterior. 

ABDOMEN:  Soft and nontender.  Bowel sounds are positive. 

EXTREMITIES:  There is no clubbing or cyanosis. 

SKIN:  Her right breast is edematous with peu d'orange skin changes.  There is 
no

palpable mass with clear margins.  Left breast is soft.  No skin changes. 

NEUROLOGIC:  Nonfocal. 

LYMPH:  She has right supraclavicular and right axillary palpable lymph nodes.



PERTINENT LABS AND X-RAYS:  Current WBCs are 9.4, hemoglobin 11.6, hematocrit 
36.2,

platelet count is 71,000, neutrophils 94%, 1% lymphocytes, PT is 13.5, INR is 1.

Sodium 135, potassium 4.1, chloride 101, CO2 is 16, BUN is 29, creatinine 2.11,

calcium 8.9, magnesium 1.9, bilirubin 0.45, AST is 49, ALT is 17, alkaline

phosphatase is 65.  Troponin is 0.122.  BNP is 251.8.  Serum total protein is 
5.9,

albumin 2.8, globulin 3.1.  Urine showed 4+ bacteria.  Radiology per HPI. 



ASSESSMENT:  

1. Right supraclavicular and axillary lymph nodes with skin changes of her right

breast, worrisome for inflammatory breast cancer. 

2. Lymphadenopathy, worrisome for lymphoma.

3. Thrombocytopenia likely secondary to malignancy.

4. Subdural hematoma, stable.



DISCUSSION:  The patient is aware of the findings of diffuse lymphadenopathy 
that

are worrisome for malignancy.  We discussed tissue biopsy of lymph node and 
treatment.  She

understands that if this is a breast cancer, it may require chemotherapy.  She 
is

not interested in IV chemotherapy but will consider oral treatment. She would 
like a biopsy. 

If she is hormone receptor positive, she could get anastrozole orally. Ibrance 
may also be an 

option.  Case discussed with Dr. Greenberg.  Consult with a general surgeon for 
biopsy of a lymph node.

We will return once tissue diagnosis is established. 



Thank you for the consult.







Job ID:  [id number]



[facility]

## 2020-01-24 NOTE — CON
DATE OF CONSULTATION:  



REASON FOR EVALUATION:  Possible subdural hematoma.



HISTORY OF PRESENT ILLNESS:  Marilee Gleason was seen at the [facility] yesterday after progressive weeks of decreased appetite and

decreased energy, feelings of imbalance and unintended weight loss.  Head CT

examination of the brain was done along with other imaging and there was a

collection of material in the subdural space anterior to the left frontal lobe as

well as a subgaleal collection of tissue or fluid under the left frontal scalp.

Neurosurgery was consulted and she was sent to [facility] for further evaluation. 



Overnight, she has remained at her neurological baseline.  Although, she got a dose

of Lovenox at [facility].  She has not had any blood thinners since.  Overnight,

her vital signs do not reveal any fever.  Her blood pressures have been in the 80s

to 100s.  On examination, Ms. Gleason is wide awake.  She answers questions

appropriately.  She remembers why she came to the hospital.  She remembers her name

and the year, her age, and where she lives.  Cognitive function is relatively well

preserved and her speech is fluent.  I do not appreciate any dysarthria or

dysphasia.  I do not appreciate any cranial neuropathies.  Although, there is a

diffused and generalized weakness.  There is no lateralized weakness.  There is no

pronator drift.  There is no neglect.  I did not get her up to walk.  She does

perform alternating rapid motions smoothly with both hands. 



IMAGING FINDINGS:  I reviewed CT imaging of the brain and there is no change between

the outside CT scan at 7:00 p.m. and one done in our hospital at 11:00 p.m.  In

fact, the collection of tissue and/or fluid might be slightly smaller, although I do

not think it is.  The subgaleal collection is the same. 



A CT examination of the chest, abdomen, and pelvis shows diffuse lymphadenopathy in

the neck, in the supraclavicular and infraclavicular space, in the chest, in the

mediastinum, in the abdomen, in the pelvis and inflammatory changes in the breast

suggestive of the possibility of inflammatory breast cancer. 



IMPRESSION:  

1. Subdural collection of tissue/fluid.

2. Diffuse metastatic cancer with poor prognosis. 



I do not believe that Ms. Gleason has a subdural hematoma.  She gives no history of

trauma.  She is tender over the mass under her scalp, but it is not ecchymotic as

though she had recently bumped her head.  I believe the subgaleal mass and the

subdural collection of tissue or metastases from an inflammatory breast cancer.

Breast cancer is one (like prostate cancer) that metastasizes to the dura from

time-to-time.  I do not see any intraparenchymal lesions.  There has been no change

even with the administration of Lovenox and no history of recent trauma. 



Given the diffuse nature of this cancer, I think the prognosis is poor, but I will

defer to the oncologist. 



Whatever systemic therapy is offered for her ought to treat dural-based metastasis

as it is not the same as the parenchymal metastasis regarding the blood-brain

barrier.  Further, I think radiation therapy could be palliative as well.  I do not

believe any neurosurgical intervention would enhance her life. 



Please feel free to call back with questions.







Job ID:  [id number]

## 2020-01-24 NOTE — PRG
DATE OF SERVICE:  01/24/2020



This is a 30-minute initial visit note, in which 30 minutes were spent reviewing the

imaging record, evaluation, examination of the patient, and formulation of plan.

Greater than 50% time was spent in counseling on Marilee Gleason. 



Ms. Gleason is a very pleasant 74-year-old woman.  She reports no history of recent

trauma to the head, although she presents with a left frontal subgaleal hematoma

that is tender to touch and underlying small left frontal convexity acute subdural

hematoma.  This was stable on repeat head CTs yesterday.  I had requested a CT this

morning although it is not back yet at this time.  There is no underlying skull

fracture.  The patient again denies trauma, but I am not quite convinced of her

historical accuracy.  She is neurologically intact, but appears to be quite frail.

There are other issues that are being evaluated based on CT scan of the chest,

abdomen, pelvis yesterday as the patient was found to have extensive lymphadenopathy

throughout her chest and abdomen, and also changes in the right breast worrisome for

breast cancer. 



I suppose the lesion in her scalp could be breast metastasis if that is what we are

dealing with.  MRI of the brain without and with contrast would help in this regard,

although it certainly has the appearance, given the underlying subdural hematoma of

recent trauma.  At this point, she has atrial fibrillation with rapid ventricular

response.  She is not currently hemodynamically stable enough for MRI.  We will hold

off on any further imaging. 







Job ID:  [id number]

## 2020-01-24 NOTE — HP
PRIMARY CARE PROVIDER:  Previously, Mary Callejas MD



CHIEF COMPLAINT:  Generalized weakness and skin nodules.



HISTORY OF PRESENT ILLNESS:  This is a 74-year-old  female, who initially

presented to [facility] in [place], [place], complaining of generalized weakness,

fatigue, and loss of appetite over the last 2 to 3 weeks.  The patient was evaluated

in the emergency room on 01/23/2020 and released home.  The patient was told to

follow up with her primary care provider, however, the patient states her symptoms

progressed with profound weakness, inability to get out of a chair and ambulate and

with concern for multiple nodules on her neck and abdomen.  The patient denied any

headache, visual disturbance, recent fall, or injury.  The patient states her

appetite has decreased with some loss of weight, which is difficult to quantitate.

The patient denies any exposure history, recent travel, unilateral swelling or

weakness.  The patient states she quit smoking approximately 2 weeks prior to this

evaluation.  In the emergency room, the patient underwent general metabolic

evaluation showing elevated troponin I.  The patient was also noted with electrolyte

abnormality as well as a low carbon dioxide level.  The patient was also noted with

elevated creatinine above baseline values concerning for acute on chronic kidney

disease.  The patient was noted with mild elevation to troponin I, receiving aspirin

and Lovenox for suspected non-ST elevation myocardial infarction with mild elevation

to troponin I.  The patient also underwent CT imaging of the brain showing small

left-sided subdural hematoma.  The patient also underwent CT imaging of the chest,

abdomen, and pelvis in the emergency room showing diffuse and extensive

lymphadenopathy with questionable right breast edema and mass. 



Recommendations are for further evaluation including Medical Oncology evaluation.



PAST MEDICAL HISTORY:  

1. 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

2. History of ischemic colitis.

3. Deconditioning.

4. Diverticulosis.

5. Hyperlipidemia.

6. Hypertension.



PAST SURGICAL HISTORY:  Status post hysterectomy.



CURRENT MEDICATIONS:  

1. Amlodipine 5 mg p.o. daily.

2. Clonidine 0.2 mg p.o. b.i.d.

3. Lovastatin 20 mg p.o. q.a.m.

4. Metoprolol tartrate 25 mg p.o. b.i.d.

5. Protonix 40 mg p.o. daily.



ALLERGIES:  TO ACE INHIBITORS.



FAMILY HISTORY:  No inheritable diseases per the patient report.



SOCIAL HISTORY:  Resides in the [place], [place] area.  No current alcohol, tobacco,

or illicit drug use.  Quit smoking approximately 2 weeks prior to this evaluation.

History of prior marijuana and methamphetamine use.  Minimally ambulatory due to

profound weakness.  No reported falls. 



REVIEW OF SYSTEMS:  CONSTITUTIONAL:  Negative for weight loss or gain, ability to

conduct usual activities. 

SKIN:  Negative for rash, itching. 

EYES:  Negative for double vision, pain. 

ENT/MOUTH:  Negative for nose bleeding, neck stiffness, pain, tenderness. 

CARDIOVASCULAR:  Negative for palpitations, dyspnea on exertion, orthopnea. 

RESPIRATORY:  Negative for shortness of breath, wheezing, cough, hemoptysis, fever

or night sweats. 

GASTROINTESTINAL:  Negative for poor appetite, abdominal pain, heartburn, nausea,

vomiting, constipation, or diarrhea. 

GENITOURINARY:  Negative for urgency, frequency, dysuria, nocturia. 

MUSCULOSKELETAL:  Negative for pain, swelling. 

NEUROLOGIC/PSYCHIATRIC:  Negative for anxiety, depression. 

ALLERGY/IMMUNOLOGIC:  Negative for skin rash, bleeding tendency. 



Otherwise, negative except as stated per HPI.



PHYSICAL EXAMINATION:

VITAL SIGNS:  On admission, blood pressure 90/56, pulse 66, respiratory rate 22, O2

saturation 97% on room air, and temperature 97.7 degrees Fahrenheit. 

GENERAL APPEARANCE:  This is a 74-year-old  female, pale, ill appearing,

responsive to questions. 

HEENT:  Pupils are equal, round, and reactive to light and accommodation.

Extraocular muscles are intact.  No scleral icterus.  No conjunctival injection.

Nares patent.  OP is clear.  Oral mucosa dry. 

NECK:  Supple.  Bilateral cervical adenopathy with large lymphadenopathy palpated at

the base of the occiput to the anterior and lateral chains.  No meningeal signs

noted. 

CHEST:  Diminished breath sounds in the bases bilaterally. 

CARDIOVASCULAR:  S1 and S2 without noted murmur, rub, or gallop. 

ABDOMEN:  Rounded with diffuse tenderness to palpation.  No palpable mass.  No

rebound or guarding noted. 

EXTREMITIES:  Warm and dry with fair turgor.  No clubbing, cyanosis, or asymmetric

edema appreciated.  Pulses palpable distally at the dorsalis pedis, posterior

tibial, and popliteal arteries bilaterally.  Capillary refill less than 2 seconds. 

NEUROLOGIC:  Cranial nerves II through XII are grossly intact.  No focal or

lateralizing signs appreciated. 



PERTINENT LABORATORY AND X-RAY FINDINGS:  Sodium 135, potassium 4.1, chloride 101,

CO2 of 16, BUN 29, creatinine 2.11, estimated GFR 23, glucose 99, magnesium level

1.9, AST 49, ALT of 17, and alkaline phosphatase 65.  Troponin I ranged between

0.122 to 0.148.  , previously noted 235 on 01/17/2020.  CBC showed a white

blood cell count of 9.4, hemoglobin 12, hematocrit 36, , and platelet count

71.  PT 13.5, INR 1.0.  Urinalysis dated 01/23/2020, positive for protein and

ketones.  Portable chest x-ray dated 01/23/2020, showed small right pleural

effusion.  CT of the chest, abdomen, and pelvis dated 01/23/2020, showed extensive

lymphadenopathy in the chest, abdomen, and pelvis.  The diminished changes in the

right breast concerning for inflammatory breast cancer.  Small right-sided pleural

effusion.  Please see dictated report for full details.  CT of the brain without

contrast dated 01/23/2020, showed left frontal extra-axial hematoma with minimal

mass effect on the left frontal lobe. 



ASSESSMENT AND PLAN:  

1. Subdural hemorrhage.  Appears acute on initial CT imaging.  Neurosurgery

consulted, however, will evaluate the patient in the a.m.  Neurosurgery recommending

serial CT imaging of the brain to monitor for progression.  Appears clinically

spontaneous. 

2. Elevated troponin I.  Suspect demand ischemic state in the context of acute

kidney injury. 

3. Acute kidney injury on chronic kidney disease.  Continue intravenous normal

saline at 100 mL/h.  Avoid nephrotoxic agents and limit contrast exposure.  Repeat

creatinine in the a.m. 

4. Left frontal subdural hematoma.  Questionable spontaneous hematoma.  No history

of falls.  Neurosurgical consultation for assistance and monitoring.  Likely, no

acute surgical intervention. 

5. Diffuse lymphadenopathy.  Findings concerning for potential metastatic process

versus lymphoma.  Consult Medical Oncology Service for any further recommendations

and evaluation.  Likely, the patient would benefit from lymph node biopsy. 

6. Deconditioning.  PT and OT evaluation for functional assessment.  Continue

regular diet. 

7. Prophylaxis.  SCDs while in bed.  Pepcid 20 mg p.o. b.i.d.

8. Code status is full.  Surrogate medical decision maker, not identified.







Job ID:  [id number]

## 2020-01-25 LAB
ALBUMIN SERPL BCG-MCNC: 2.1 G/DL (ref 3.4–4.8)
ALP SERPL-CCNC: 53 U/L (ref 40–110)
ALT SERPL W P-5'-P-CCNC: 12 U/L (ref 8–55)
ANION GAP SERPL CALC-SCNC: 13 MMOL/L (ref 10–20)
AST SERPL-CCNC: 36 U/L (ref 5–34)
BASOPHILS # BLD AUTO: 0 THOU/UL (ref 0–0.2)
BASOPHILS NFR BLD AUTO: 0.4 % (ref 0–1)
BILIRUB SERPL-MCNC: 0.4 MG/DL (ref 0.2–1.2)
BUN SERPL-MCNC: 29 MG/DL (ref 9.8–20.1)
CALCIUM SERPL-MCNC: 7.3 MG/DL (ref 7.8–10.44)
CHLORIDE SERPL-SCNC: 111 MMOL/L (ref 98–107)
CO2 SERPL-SCNC: 14 MMOL/L (ref 23–31)
CREAT CL PREDICTED SERPL C-G-VRATE: 33 ML/MIN (ref 70–130)
EOSINOPHIL # BLD AUTO: 0 THOU/UL (ref 0–0.7)
EOSINOPHIL NFR BLD AUTO: 0.3 % (ref 0–10)
GLOBULIN SER CALC-MCNC: 2.7 G/DL (ref 2.4–3.5)
GLUCOSE SERPL-MCNC: 76 MG/DL (ref 83–110)
HGB BLD-MCNC: 9.4 G/DL (ref 12–16)
LYMPHOCYTES # BLD: 0.9 THOU/UL (ref 1.2–3.4)
LYMPHOCYTES NFR BLD AUTO: 8.7 % (ref 21–51)
MCH RBC QN AUTO: 34.8 PG (ref 27–31)
MCV RBC AUTO: 106 FL (ref 78–98)
MONOCYTES # BLD AUTO: 0.2 THOU/UL (ref 0.11–0.59)
MONOCYTES NFR BLD AUTO: 1.7 % (ref 0–10)
NEUTROPHILS # BLD AUTO: 9.1 THOU/UL (ref 1.4–6.5)
NEUTROPHILS NFR BLD AUTO: 88.9 % (ref 42–75)
PLATELET # BLD AUTO: 73 THOU/UL (ref 130–400)
POTASSIUM SERPL-SCNC: 3.3 MMOL/L (ref 3.5–5.1)
RBC # BLD AUTO: 2.7 MILL/UL (ref 4.2–5.4)
SODIUM SERPL-SCNC: 135 MMOL/L (ref 136–145)
WBC # BLD AUTO: 10.3 THOU/UL (ref 4.8–10.8)

## 2020-01-25 RX ADMIN — Medication SCH ML: at 20:37

## 2020-01-25 RX ADMIN — Medication SCH ML: at 08:19

## 2020-01-25 NOTE — MRI
EXAM: MRI of the brain without and with contrast



HISTORY: Fall with left extra-axial mass



COMPARISON: CT brain 1/23/2020



TECHNIQUE: Multiplanar multisequence MR images were obtained of the brain without and with IV contras
t.



FINDINGS:

There are a few scattered foci of high T2/FLAIR signal in the subcortical and periventricular white m
atter. There is a and area of focal thickening of the dura up to 7 mm in the region where the

abnormality is seen on CT. No definite extra-axial blood is appreciated as there is no blooming on th
e gradient sequence and no signal abnormality on the FLAIR sequence.



No restricted diffusion.

No hydronephrosis.



The expected flow voids are present.

Corpus callosum, pituitary, and craniocervical junction are within normal limits.



A hematoma seen along the left parietal scalp.

Fluid is seen in the right mastoid air cells. The paranasal sinuses and left mastoid air cells are we
ll aerated.



IMPRESSION:



1. No evidence of acute intracranial abnormality.

2. Nonspecific focal thickening of the left parietal dura versus small meningioma.



This exam was discussed with Dr. Astudillo who presents the findings and impression.



Reported By: Tez Null 

Electronically Signed:  1/25/2020 12:48 PM

## 2020-01-25 NOTE — PDOC.HOSPP
- Subjective


Subjective: 


Complaint of neck pain today. She has tender lymph nodes present there. 

Otherwise no overnight events. Afib is now rate controlled.





- Objective


Vital Signs & Weight: 


 Vital Signs (12 hours)











  Temp Pulse Ox


 


 01/25/20 08:00  98.1 F 


 


 01/25/20 07:54   96


 


 01/25/20 04:00  98.3 F 


 


 01/25/20 02:53   97


 


 01/25/20 00:00  98.2 F 








 Weight











Admit Weight                   145 lb


 


Weight                         145 lb 4.554 oz











 Most Recent Monitor Data











Heart Rate from ECG            71


 


NIBP                           89/59


 


NIBP BP-Mean                   69


 


Respiration from ECG           20


 


SpO2                           97














I&O: 


 











 01/24/20 01/25/20 01/26/20





 06:59 06:59 06:59


 


Intake Total 295 3052 330


 


Output Total 0 1352 390


 


Balance 295 1700 -60











Result Diagrams: 


 01/25/20 03:33





 01/25/20 03:33





Hospitalist ROS





- Review of Systems


All other systems reviewed; all pertinent +/- noted in HPI/Subj





- Medication


Medications: 


Active Medications











Generic Name Dose Route Start Last Admin





  Trade Name Freq  PRN Reason Stop Dose Admin


 


Acetaminophen  1,000 mg  01/24/20 03:35  01/24/20 12:41





  Tylenol  PO   1,000 mg





  Q6H PRN   Administration





  Mild Pain (1-3)   





     





     





     


 


Dronedarone  400 mg  01/24/20 17:00  01/25/20 08:19





  Multaq  PO   400 mg





  BID-WM TINO   Administration





     





     





     





     


 


Famotidine  20 mg  01/24/20 09:00  01/25/20 08:19





  Pepcid  PO   20 mg





  QAM TINO   Administration





     





     





     





     


 


Sodium Chloride  1,000 mls @ 100 mls/hr  01/24/20 03:35  01/25/20 05:58





  Normal Saline 0.9%  IV   1,000 mls





  .Q10H TINO   Administration





     





     





     





     


 


Metoprolol Tartrate  25 mg  01/24/20 21:00  01/25/20 08:19





  Lopressor  PO   Not Given





  BID TINO   





     





     





     





     


 


Sodium Chloride  10 ml  01/24/20 21:00  01/25/20 08:19





  Flush - Normal Saline  IVF   10 ml





  Q12HR TINO   Administration





     





     





     





     














- Exam


General Appearance: NAD, awake alert, ill appearing


General - other findings: Frail, elderly


Eye: PERRL, anicteric sclera


ENT: normocephalic atraumatic, no oropharyngeal lesions, moist mucosa


Neck: supple, symmetric, no JVD, no thyromegaly, no lymphadenopathy, no carotid 

bruit


Neck - other findings: Palpable lymph node chain on right and left neck


Heart: RRR, no murmur, no gallops, no rubs, normal peripheral pulses


Respiratory: CTAB, no wheezes, no rales, no ronchi, normal chest expansion, no 

tachypnea, normal percussion


Gastrointestinal: soft, non-tender, non-distended, normal bowel sounds, no 

palpable masses, no hepatomegaly, no splenomegaly, no bruit


Extremities: no cyanosis, no clubbing, no edema


Skin: normal turgor, no lesions, no rashes


Neurological: cranial nerve grossly intact, normal sensation to touch, no 

weakness, no focal deficits, no new deficit


Musculoskeletal: normal tone, normal strength, no muscle wasting


Psychiatric: normal affect, normal behavior, A&O x 3





Hosp A/P


(1) Subdural hemorrhage


Code(s): I62.00 - NONTRAUMATIC SUBDURAL HEMORRHAGE, UNSPECIFIED   Status: Acute

   





(2) Elevated troponin


Code(s): R79.89 - OTHER SPECIFIED ABNORMAL FINDINGS OF BLOOD CHEMISTRY   Status

: Acute   





(3) Lymphadenopathy


Code(s): R59.1 - GENERALIZED ENLARGED LYMPH NODES   Status: Acute   





(4) Acute kidney injury superimposed on CKD


Code(s): N17.9 - ACUTE KIDNEY FAILURE, UNSPECIFIED; N18.9 - CHRONIC KIDNEY 

DISEASE, UNSPECIFIED   Status: Acute   





(5) Anemia in chronic kidney disease


Code(s): N18.9 - CHRONIC KIDNEY DISEASE, UNSPECIFIED; D63.1 - ANEMIA IN CHRONIC 

KIDNEY DISEASE   Status: Acute   





(6) Hypertension


Code(s): I10 - ESSENTIAL (PRIMARY) HYPERTENSION   Status: Chronic   





(7) Atrial fibrillation with RVR


Code(s): I48.91 - UNSPECIFIED ATRIAL FIBRILLATION   Status: Acute   





- Plan


Outside Ct scan for Gretchen reviewed by me, shows extensive lymphadenopathy in 

the chest, abdomen, and pelvis. Will see if this report can be uploaded to our 

record system


There is mention of edematous change in the right breast, which is concerning 

for inflammatory breast CA.


CT head showsing extra-axial 0.7cm hematoma? This could be mass as well. 

Pending MRI studies


Appreciate oncology and neurosugery reccs


Will likely need some sort lymph node biopsy, gen surg has been consulted


Resume home amlodipine


D/c IVF


for afib, consult to cardiology for further reccs, they have resrtared multaq 

and metoprolol


Rate controlled today, can transfer to telemetry





Disposition: Continue monitoring in CCU. Appreciate reccs from neurosurg and 

oncology. Pending MRI and biopsy.

## 2020-01-25 NOTE — PDOC.CPN
- Subjective


Date: 01/25/20


Time: 11:13


Interval history: 


Pt in SR


Echo felt to be hypercontractile (>65%)


On BB, multaq





- Objective


Allergies/Adverse Reactions: 


 Allergies











Allergy/AdvReac Type Severity Reaction Status Date / Time


 


ACE Inhibitors Allergy   Verified 01/24/20 02:59











Visit Medications: 


 Current Medications





Acetaminophen (Tylenol)  1,000 mg PO Q6H PRN


   PRN Reason: Mild Pain (1-3)


   Last Admin: 01/24/20 12:41 Dose:  1,000 mg


Dronedarone (Multaq)  400 mg PO BID-WM [facility]


   Last Admin: 01/25/20 08:19 Dose:  400 mg


Famotidine (Pepcid)  20 mg PO QAM [facility]


   Last Admin: 01/25/20 08:19 Dose:  20 mg


Sodium Chloride (Normal Saline 0.9%)  1,000 mls @ 100 mls/hr IV .Q10H [facility]


   Last Admin: 01/25/20 05:58 Dose:  1,000 mls


Metoprolol Tartrate (Lopressor)  25 mg PO BID [facility]


   Last Admin: 01/25/20 08:19 Dose:  Not Given


Ondansetron HCl (Zofran Odt)  4 mg PO Q6H PRN


   PRN Reason: Nausea/Vomiting


Ondansetron HCl (Zofran)  4 mg IVP Q6H PRN


   PRN Reason: Nausea/Vomiting


Sodium Chloride (Flush - Normal Saline)  10 ml IVF Q12HR [facility]


   Last Admin: 01/25/20 08:19 Dose:  10 ml


Sodium Chloride (Flush - Normal Saline)  10 ml IVF PRN PRN


   PRN Reason: Saline Flush


Tramadol HCl (Ultram)  50 mg PO Q6H PRN


   PRN Reason: Moderate Pain (4-6)








Vital Signs & Weight: 


 Vital Signs











  Temp Pulse Ox


 


 01/25/20 08:00  98.1 F 


 


 01/25/20 07:54   96


 


 01/25/20 04:00  98.3 F 


 


 01/25/20 02:53   97


 


 01/25/20 00:00  98.2 F 








 











Admit Weight                   145 lb


 


Weight                         145 lb 4.554 oz

















- Physical Exam


General: alert & oriented x3


HEENT: mucus membranes moist


Neck: supple neck


Cardiac: regular rate and rhythm


Lungs: clear to auscultation


Neuro: cranial nerve 2-12 intact





- Labs


Result Diagrams: 


 01/25/20 03:33





 01/25/20 03:33


 Troponin/CKMB











CK-MB (CK-2)  2.1 ng/mL (0-6.6)   01/23/20  22:54    


 


Troponin I  0.122 ng/mL (< 0.028)  H  01/23/20  22:54    














- Diagnostic


  ** MRI


Status: other (reviewed findings)





- Telemetry


Sinus rhythms and dysrhythmias: sinus rhythm





- Assessment/Plan


Assessment/Plan: 


PAF


?subdural hematoma


Likey metastaic breast cancer





In SR


On BB, multaq


Given echo, given IVF.  


IVF


EF >65%.  Appears underfilled


Continue IVF

## 2020-01-26 RX ADMIN — Medication SCH: at 09:12

## 2020-01-26 RX ADMIN — Medication SCH: at 22:09

## 2020-01-26 NOTE — PRG
DATE OF SERVICE:  01/26/2020



I reviewed Ms. Gleason's MRI of the brain without and with contrast.  Unfortunately,

she has pachymeningeal enhancement with dural thickening and skull and subgaleal

thickening as well consistent with breast carcinoma.  I would not recommend any

surgical intervention. 







Job ID:  [id number]

## 2020-01-26 NOTE — PRG
DATE OF SERVICE:  01/26/2020



This is Wili Landeros PA-C dictating a report for Jose Manuel Wallace MD.



SUBJECTIVE:  This is a 50-minute subsequent patient evaluation, of which greater

than 50% of the exam was spent in counseling and coordinating the patient's care,

remainder of the exam was spent in reviewing the patient's medical record and

reviewing appropriate imaging studies.  Dr. Wallace and I have reviewed the patient's

brain MRI that she had yesterday.  There does appear to be pachymeningeal

enhancement in the left parietal lobe with bone and subgaleal enhancement signifying

likely metastatic breast cancer.  There does not appear to be any subdural

hemorrhage.  At this time, there is no role for neurosurgical intervention.  The

patient, should she and her family wish to proceed, would benefit from likely

chemotherapy and radiation.  We will allow our medical colleagues in our Oncology

team to help direct this.  I have discussed the findings with the patient and she

understands.  Neurologically, she complains of intermittent posterior neck pain, but

denies headache.  She denies nausea, vomiting, or dizziness. 



OBJECTIVE:  On physical exam, she is able to follow commands in all extremities.

Her speech is clear and coherent.  She is oriented to year and that she is in the

hospital. 



IMPRESSION AND PLAN:  At this time, Neurosurgery will sign off as there is no

indication for any type of surgery.  We appreciate the consult.  Please call with

any changes in the patient's neurologic status or any questions in the patient's

care. 







Job ID:  [id number]

## 2020-01-26 NOTE — PDOC.HOSPP
- Objective


Vital Signs & Weight: 


 Vital Signs (12 hours)











  Temp Pulse Resp BP Pulse Ox


 


 01/26/20 11:14  98.1 F  70  16  77/49 L  94 L


 


 01/26/20 08:00  98.2 F  73  13  80/50 L  96


 


 01/26/20 04:00  98.2 F  68  20  75/46 L  93 L








 Weight











Admit Weight                   145 lb


 


Weight                         145 lb 4.554 oz











 Most Recent Monitor Data











Heart Rate from ECG            68


 


NIBP                           93/57


 


NIBP BP-Mean                   69


 


Respiration from ECG           18


 


SpO2                           96














I&O: 


 











 01/25/20 01/26/20 01/27/20





 06:59 06:59 06:59


 


Intake Total 3052 2446 420


 


Output Total 1352 1420 


 


Balance 1700 1026 420











Result Diagrams: 


 01/25/20 03:33





 01/25/20 03:33





Hospitalist ROS





- Medication


Medications: 


Active Medications











Generic Name Dose Route Start Last Admin





  Trade Name Freq  PRN Reason Stop Dose Admin


 


Acetaminophen  1,000 mg  01/24/20 03:35  01/26/20 09:10





  Tylenol  PO   1,000 mg





  Q6H PRN   Administration





  Mild Pain (1-3)   





     





     





     


 


Dronedarone  400 mg  01/24/20 17:00  01/26/20 09:12





  Multaq  PO   400 mg





  BID-WM TINO   Administration





     





     





     





     


 


Famotidine  20 mg  01/24/20 09:00  01/26/20 09:11





  Pepcid  PO   Not Given





  QAM TINO   





     





     





     





     


 


Sodium Chloride  1,000 mls @ 100 mls/hr  01/24/20 03:35  01/26/20 09:12





  Normal Saline 0.9%  IV   1,000 mls





  .Q10H TINO   Administration





     





     





     





     


 


Metoprolol Tartrate  25 mg  01/24/20 21:00  01/26/20 09:03





  Lopressor  PO   Not Given





  BID TINO   





     





     





     





     


 


Potassium Chloride  40 meq  01/25/20 13:24  01/25/20 13:34





  K-Dur  PO   40 meq





  ASDIR PRN   Administration





  FOR SERUM K+  2.5 - 3.5   





     





     





     


 


Sodium Chloride  10 ml  01/24/20 21:00  01/26/20 09:12





  Flush - Normal Saline  IVF   Not Given





  Q12HR TINO   





     





     





     





     


 


Tramadol HCl  50 mg  01/25/20 11:03  01/25/20 11:24





  Ultram  PO   50 mg





  Q6H PRN   Administration





  Moderate Pain (4-6)   





     





     





     














Hosp A/P


(1) Brain mass


Code(s): G93.89 - OTHER SPECIFIED DISORDERS OF BRAIN   Status: Acute   


Plan: 


Initially thought to be subdural on previous imaging, MRI suggestive of 

enhancement, possible brain mass








(2) Lymphadenopathy


Code(s): R59.1 - GENERALIZED ENLARGED LYMPH NODES   Status: Acute   





(3) Suspected malignant neoplasm


Code(s): R68.89 - OTHER GENERAL SYMPTOMS AND SIGNS   Status: Acute   


Plan: 


Palpable lymph nodes, CT imaging positive, suggestive primary breast CA








(4) Elevated troponin


Code(s): R79.89 - OTHER SPECIFIED ABNORMAL FINDINGS OF BLOOD CHEMISTRY   Status

: Acute   





(5) Acute kidney injury superimposed on CKD


Code(s): N17.9 - ACUTE KIDNEY FAILURE, UNSPECIFIED; N18.9 - CHRONIC KIDNEY 

DISEASE, UNSPECIFIED   Status: Acute   





(6) Anemia in chronic kidney disease


Code(s): N18.9 - CHRONIC KIDNEY DISEASE, UNSPECIFIED; D63.1 - ANEMIA IN CHRONIC 

KIDNEY DISEASE   Status: Acute   





(7) Hypertension


Code(s): I10 - ESSENTIAL (PRIMARY) HYPERTENSION   Status: Chronic   





(8) Atrial fibrillation with RVR


Code(s): I48.91 - UNSPECIFIED ATRIAL FIBRILLATION   Status: Acute   





- Plan


Outside Ct scan for Gretchen reviewed by me, shows extensive lymphadenopathy in 

the chest, abdomen, and pelvis. 


There is mention of edematous change in the right breast, which is concerning 

for inflammatory breast CA.


CT head showsing extra-axial 0.7cm hematoma? This is more likely a mass as seen 

on MRI. Discussed with neurosurg and agreement this is more likely metastatic 

lesion


Appreciate oncology and neurosugery reccs


Will likely need some sort lymph node biopsy, gen surg has been consulted


Resume home amlodipine


for afib, consult to cardiology for further reccs, they have resrtared multaq 

and metoprolol


Rate controlled today





Disposition: Discussed with patient about strong possibilty of metastatic 

disease. She would like to obtain a biopsy of lymph node before making other 

decisions. Gen surg will see her tomorrow for planning.

## 2020-01-27 LAB
ANION GAP SERPL CALC-SCNC: 12 MMOL/L (ref 10–20)
BASOPHILS # BLD AUTO: 0.1 THOU/UL (ref 0–0.2)
BASOPHILS NFR BLD AUTO: 1.1 % (ref 0–1)
BUN SERPL-MCNC: 24 MG/DL (ref 9.8–20.1)
CALCIUM SERPL-MCNC: 7.2 MG/DL (ref 7.8–10.44)
CHLORIDE SERPL-SCNC: 110 MMOL/L (ref 98–107)
CO2 SERPL-SCNC: 14 MMOL/L (ref 23–31)
CREAT CL PREDICTED SERPL C-G-VRATE: 36 ML/MIN (ref 70–130)
EOSINOPHIL # BLD AUTO: 0.1 THOU/UL (ref 0–0.7)
EOSINOPHIL NFR BLD AUTO: 0.5 % (ref 0–10)
GLUCOSE SERPL-MCNC: 59 MG/DL (ref 83–110)
HGB BLD-MCNC: 9.4 G/DL (ref 12–16)
LYMPHOCYTES # BLD: 1.2 THOU/UL (ref 1.2–3.4)
LYMPHOCYTES NFR BLD AUTO: 10.4 % (ref 21–51)
MCH RBC QN AUTO: 35.9 PG (ref 27–31)
MCV RBC AUTO: 106 FL (ref 78–98)
MONOCYTES # BLD AUTO: 0.2 THOU/UL (ref 0.11–0.59)
MONOCYTES NFR BLD AUTO: 1.5 % (ref 0–10)
NEUTROPHILS # BLD AUTO: 9.9 THOU/UL (ref 1.4–6.5)
NEUTROPHILS NFR BLD AUTO: 86.5 % (ref 42–75)
PLATELET # BLD AUTO: 71 THOU/UL (ref 130–400)
POTASSIUM SERPL-SCNC: 3.4 MMOL/L (ref 3.5–5.1)
RBC # BLD AUTO: 2.61 MILL/UL (ref 4.2–5.4)
SODIUM SERPL-SCNC: 133 MMOL/L (ref 136–145)
WBC # BLD AUTO: 11.5 THOU/UL (ref 4.8–10.8)

## 2020-01-27 RX ADMIN — Medication SCH: at 21:31

## 2020-01-27 RX ADMIN — Medication SCH: at 10:11

## 2020-01-27 NOTE — PDOC.CPN
- Subjective


Date: 01/27/20


Time: 10:08





- Objective


Allergies/Adverse Reactions: 


 Allergies











Allergy/AdvReac Type Severity Reaction Status Date / Time


 


ACE Inhibitors Allergy   Verified 01/24/20 02:59











Visit Medications: 


 Current Medications





Acetaminophen (Tylenol)  1,000 mg PO Q6H PRN


   PRN Reason: Mild Pain (1-3)


   Last Admin: 01/26/20 09:10 Dose:  1,000 mg


Dronedarone (Multaq)  400 mg PO BID-[facility]


   Last Admin: 01/26/20 16:51 Dose:  400 mg


[facility] ([facility]epcid)  [facility]


   Last Admin: 01/26/20 09:11 Dose:  Not Given


Sodium Chloride (Normal Saline 0.9%)  1,000 mls @ 100 mls/hr IV .Q10H [facility]


   Last Admin: 01/27/20 02:34 Dose:  1,000 mls


Potassium Chloride 40 meq/ (Sodium Chloride)  270 mls @ 135 mls/hr IVPB ASDIR 

PRN


   PRN Reason: FOR SERUM K+ 2.5 - 3.5


Potassium Chloride 40 meq/ (Device)  100 mls @ 50 mls/hr IVPB ASDIR PRN


   PRN Reason: FOR SERUM K+ 2.5 - 3.5


Magnesium Sulfate 1 gm/ Sodium (Chloride)  102 mls @ 102 mls/hr IV PRN PRN


   PRN Reason: MAG LEVEL 1.4 - 2.0


Magnesium Sulfate 2 gm/ Device  50 mls @ 50 mls/hr IVPB ASDIR PRN


   PRN Reason: MAGNESIUM < 1.4


Potassium Phosphate 9 mmol/ (Sodium Chloride)  103 mls @ 25.75 mls/hr IVPB 

ASDIR PRN


   PRN Reason: Phosphate 1.0-1.8


Potassium Phosphate 12 mmol/ (Sodium Chloride)  254 mls @ 63.5 mls/hr IV ASDIR 

PRN


   PRN Reason: Serum phosphate 0.5-0.9


Potassium Phosphate 15 mmol/ (Sodium Chloride)  255 mls @ 63.75 mls/hr IV ASDIR 

PRN


   PRN Reason: Serum Phos < 0.5


Magnesium Oxide (Magnesium Oxide)  400 mg PO BIDPRN PRN


   PRN Reason: FOR SERUM MAG 1.4 - 2.0


Magnesium Oxide (Magnesium Oxide)  800 mg PO PRN PRN


   PRN Reason: FOR SERUM MAG < 1.4


Miscellaneous Medication (Phos-Nak)  1 pkt PO TIDPRN PRN


   PRN Reason: FOR PHOS LEVEL 1.0 - 1.8


Miscellaneous Medication (Phos-Nak)  2 pkt PO TIDPRN PRN


   PRN Reason: FOR PHOS LEVEL 0.5 - 1.0


Ccu Electrolyte (Replacement Protocol)  0 each FS PRN PRN


   PRN Reason: FOR ELECTROLYTE REPLACEMENT


Ondansetron HCl (Zofran Odt)  4 mg PO Q6H PRN


   PRN Reason: Nausea/Vomiting


Ondansetron HCl (Zofran)  4 mg IVP Q6H PRN


   PRN Reason: Nausea/Vomiting


Potassium Chloride (K-Dur)  40 meq PO ASDIR PRN


   PRN Reason: FOR SERUM K+  2.5 - 3.5


   Last Admin: 01/25/20 13:34 Dose:  40 meq


Potassium Chloride (Klor-Con)  40 meq PER TUBE ASDIR PRN


   PRN Reason: FOR SERUM K+ 2.5-3.5


Sodium Chloride (Flush - Normal Saline)  10 ml IVF Q12HR TINO


   Last Admin: 01/26/20 22:09 Dose:  Not Given


Sodium Chloride (Flush - Normal Saline)  10 ml IVF PRN PRN


   PRN Reason: Saline Flush


Tramadol HCl (Ultram)  50 mg PO Q6H PRN


   PRN Reason: Moderate Pain (4-6)


   Last Admin: 01/25/20 11:24 Dose:  50 mg








Vital Signs & Weight: 


 Vital Signs











  Temp Pulse Resp BP Pulse Ox


 


 01/27/20 04:00  98 F  70  20  90/54 L  93 L


 


 01/27/20 00:00  98.4 F  67  18  85/54 L  93 L


 


 01/26/20 20:00  97.8 F  67  18  83/53 L  93 L








 











Admit Weight                   145 lb


 


Weight                         145 lb 4.554 oz

















- Physical Exam


General: alert & oriented x3


Neck: supple neck


Cardiac: no murmur, regular rate, regular rhythm


Lungs: normal breath sounds


Neuro: grossly intact





- Labs


Result Diagrams: 


 01/27/20 04:31





 01/27/20 04:31


 Troponin/CKMB











CK-MB (CK-2)  2.1 ng/mL (0-6.6)   01/23/20  22:54    


 


Troponin I  0.122 ng/mL (< 0.028)  H  01/23/20  22:54    














- Assessment/Plan


Assessment/Plan: 


Afib


Likey metasstatic breast cancer


Dural mass





Pt is in SR and doing well on multaq


Consideration to ACT given although with recent dural mass and low platelets, 

will defer at this point


Prognosis appears poor as well with short term risk of ACT likely higher than 

mortality from metastatic disease


Will defer prognosis to oncology once tissue obtained


PLease re-consult if changes occur


Add ASA.  Discussed with Neurosurgery

## 2020-01-27 NOTE — PDOC.HOSPP
- Subjective


Subjective: 





lethargic, plan for sux bx today, pt anxious when that would be done. care d/w 

RN.





- Objective


Vital Signs & Weight: 


 Vital Signs (12 hours)











  Temp Pulse Pulse Pulse Resp BP BP


 


 01/27/20 11:20  97.3 F L  71    20  


 


 01/27/20 10:15   75     


 


 01/27/20 10:12    73  87   87/51 L  104/63


 


 01/27/20 07:47  98.1 F  67    20  


 


 01/27/20 04:00  98 F  70    20  














  BP Pulse Ox


 


 01/27/20 11:20  85/54 L  95


 


 01/27/20 10:15  90/52 L  94 L


 


 01/27/20 10:12  


 


 01/27/20 07:47  89/50 L  91 L


 


 01/27/20 04:00  90/54 L  93 L








 Weight











Admit Weight                   145 lb


 


Weight                         145 lb 4.554 oz











 Most Recent Monitor Data











Heart Rate from ECG            68


 


NIBP                           93/57


 


NIBP BP-Mean                   69


 


Respiration from ECG           18


 


SpO2                           96














I&O: 


 











 01/26/20 01/27/20 01/28/20





 06:59 06:59 06:59


 


Intake Total 2446 1814 


 


Output Total 1420 700 


 


Balance 1026 1114 











Result Diagrams: 


 01/27/20 04:31





 01/27/20 04:31


Additional Labs: 


 Accuchecks











  01/27/20 01/27/20





  06:44 05:22


 


POC Glucose  95  72














Hospitalist ROS





- Medication


Medications: 


Active Medications











Generic Name Dose Route Start Last Admin





  Trade Name Freq  PRN Reason Stop Dose Admin


 


Acetaminophen  1,000 mg  01/24/20 03:35  01/26/20 09:10





  Tylenol  PO   1,000 mg





  Q6H PRN   Administration





  Mild Pain (1-3)   





     





     





     


 


Dronedarone  400 mg  01/24/20 17:00  01/27/20 10:13





  Multaq  PO   400 mg





  BID-WM TINO   Administration





     





     





     





     


 


Famotidine  20 mg  01/24/20 09:00  01/27/20 10:21





  Pepcid  PO   20 mg





  QAM TINO   Administration





     





     





     





     


 


Sodium Chloride  1,000 mls @ 100 mls/hr  01/24/20 03:35  01/27/20 02:34





  Normal Saline 0.9%  IV   1,000 mls





  .Q10H TINO   Administration





     





     





     





     


 


Potassium Chloride  40 meq  01/25/20 13:24  01/25/20 13:34





  K-Dur  PO   40 meq





  ASDIR PRN   Administration





  FOR SERUM K+  2.5 - 3.5   





     





     





     


 


Sodium Chloride  10 ml  01/24/20 21:00  01/27/20 10:11





  Flush - Normal Saline  IVF   Not Given





  Q12HR TINO   





     





     





     





     


 


Tramadol HCl  50 mg  01/25/20 11:03  01/25/20 11:24





  Ultram  PO   50 mg





  Q6H PRN   Administration





  Moderate Pain (4-6)   





     





     





     














- Exam


General Appearance: awake alert, ill appearing


Eye: PERRL, anicteric sclera


Neck: symmetric


Heart: RRR


Respiratory: CTAB, no wheezes


Gastrointestinal: soft, non-distended, normal bowel sounds


Extremities: no edema


Neurological: no focal deficits





Hosp A/P





- Plan


old records reviewed/req








(1) Brain mass





(2) Lymphadenopathy





(3) Suspected malignant neoplasm


Outside Ct scan for Gretchen reviewed by prior provider, shows extensive 

lymphadenopathy in the chest, abdomen, and pelvis. 


-  edematous change in the right breast, which is concerning for inflammatory 

breast CA.


CT head showsing extra-axial 0.7cm hematoma? This is more likely a mass as seen 

on MRI.


-  followed by  oncology and neurosugery reccs


- gen surg has been consulted - poss breast bx. 





(4) Elevated troponin-type II





(5) Acute kidney injury superimposed on CKD





(6) Anemia in chronic kidney disease


-stable





(7) Hypertension


-norvasc


-controlled. 





(8) Atrial fibrillation with RVR


- resrtared on multaq and metoprolol


-cardiology following.

## 2020-01-28 LAB
ANION GAP SERPL CALC-SCNC: 10 MMOL/L (ref 10–20)
BUN SERPL-MCNC: 22 MG/DL (ref 9.8–20.1)
CALCIUM SERPL-MCNC: 7.2 MG/DL (ref 7.8–10.44)
CHLORIDE SERPL-SCNC: 112 MMOL/L (ref 98–107)
CO2 SERPL-SCNC: 15 MMOL/L (ref 23–31)
CREAT CL PREDICTED SERPL C-G-VRATE: 40 ML/MIN (ref 70–130)
GLUCOSE SERPL-MCNC: 63 MG/DL (ref 83–110)
HGB BLD-MCNC: 9.1 G/DL (ref 12–16)
MACROCYTES BLD QL SMEAR: (no result) (100X)
MCH RBC QN AUTO: 35.1 PG (ref 27–31)
MCV RBC AUTO: 104 FL (ref 78–98)
MDIFF COMPLETE?: YES
PLATELET # BLD AUTO: 61 THOU/UL (ref 130–400)
POTASSIUM SERPL-SCNC: 3.1 MMOL/L (ref 3.5–5.1)
POTASSIUM SERPL-SCNC: 3.5 MMOL/L (ref 3.5–5.1)
RBC # BLD AUTO: 2.59 MILL/UL (ref 4.2–5.4)
SODIUM SERPL-SCNC: 134 MMOL/L (ref 136–145)
WBC # BLD AUTO: 10.5 THOU/UL (ref 4.8–10.8)

## 2020-01-28 RX ADMIN — Medication SCH ML: at 23:35

## 2020-01-28 RX ADMIN — Medication SCH ML: at 08:58

## 2020-01-28 NOTE — PDOC.HOSPP
- Subjective


Subjective: 





pt looked fragile.  Talk to sux, it appears there may not be breast lump, and 

if needed, have to get it from [facility] and followed by core bx, if mass 

do present.  Talk to the CM and pt cannot be sent from here but can be done as 

outpt.


i discussed at length with the patient about overall poor prognosis and 

uncertain whether having breast biopsy would change the prognosis.  Pt 

understands and leaning towards less care but she is going to think baout it. 

she lives with sig. other at home.  Palliative consult has been placed 2 days 

prior. 


Placing OT consult and possible SNF vs.. rehab.  





- Objective


Vital Signs & Weight: 


 Vital Signs (12 hours)











  Temp Pulse Resp BP Pulse Ox


 


 01/28/20 11:52  97.9 F  72  18  82/51 L  94 L


 


 01/28/20 07:56  97.5 F L  70  18  83/52 L  93 L


 


 01/28/20 04:00  98.4 F  75  16  80/52 L  92 L








 Weight











Admit Weight                   145 lb


 


Weight                         145 lb 4.554 oz











 Most Recent Monitor Data











Heart Rate from ECG            68


 


NIBP                           93/57


 


NIBP BP-Mean                   69


 


Respiration from ECG           18


 


SpO2                           96














I&O: 


 











 01/27/20 01/28/20 01/29/20





 06:59 06:59 06:59


 


Intake Total 1814 120 120


 


Output Total 700  


 


Balance 1114 120 120











Result Diagrams: 


 01/28/20 04:40





 01/28/20 04:40





Hospitalist ROS





- Medication


Medications: 


Active Medications











Generic Name Dose Route Start Last Admin





  Trade Name Freq  PRN Reason Stop Dose Admin


 


Acetaminophen  1,000 mg  01/24/20 03:35  01/26/20 09:10





  Tylenol  PO   1,000 mg





  Q6H PRN   Administration





  Mild Pain (1-3)   





     





     





     


 


Dronedarone  400 mg  01/24/20 17:00  01/28/20 08:58





  Multaq  PO   400 mg





  BID-WM TINO   Administration





     





     





     





     


 


Famotidine  20 mg  01/24/20 09:00  01/28/20 10:32





  Pepcid  PO   Not Given





  QAM TINO   





     





     





     





     


 


Sodium Chloride  1,000 mls @ 100 mls/hr  01/24/20 03:35  01/28/20 10:09





  Normal Saline 0.9%  IV   1,000 mls





  .Q10H TINO   Administration





     





     





     





     


 


Potassium Chloride  40 meq  01/25/20 13:24  01/25/20 13:34





  K-Dur  PO   40 meq





  ASDIR PRN   Administration





  FOR SERUM K+  2.5 - 3.5   





     





     





     


 


Sodium Chloride  10 ml  01/24/20 21:00  01/28/20 08:58





  Flush - Normal Saline  IVF   10 ml





  Q12HR TINO   Administration





     





     





     





     


 


Tramadol HCl  50 mg  01/25/20 11:03  01/25/20 11:24





  Ultram  PO   50 mg





  Q6H PRN   Administration





  Moderate Pain (4-6)   





     





     





     














- Exam


General Appearance: NAD, awake alert


Eye: PERRL


Neck: supple, symmetric, no JVD


Heart: RRR, no murmur


Respiratory: CTAB


Gastrointestinal: soft, normal bowel sounds


Neurological: no focal deficits





Hosp A/P





- Plan


old records reviewed/req








(1) Brain mass





(2) Lymphadenopathy





(3) Suspected malignant neoplasm


Outside Ct scan for Gretchen reviewed by prior provider, shows extensive 

lymphadenopathy in the chest, abdomen, and pelvis. 


-  edematous change in the right breast, which is concerning for inflammatory 

breast CA.


CT head showsing extra-axial 0.7cm hematoma? This is more likely a mass as seen 

on MRI.


-  followed by  oncology and neurosugery reccs


-  breast bx is not done.


-appreciate sux input. [see below]





(4) Elevated troponin-type II





(5) Acute kidney injury superimposed on CKD





(6) Anemia in chronic kidney disease


-stable





(7) Hypertension


-norvasc


-controlled. 





(8) Atrial fibrillation with RVR


- resrtared on multaq and metoprolol


-cardiology following. 





Hypokalemia


-being replaced. 





Talk to sux, it appears there may not be breast lump, and if needed, have to 

get it from [facility] and followed by core bx, if mass do present.  Talk 

to the CM and pt cannot be sent from here but can be done as outpt.


i discussed at length with the patient about overall poor prognosis and 

uncertain whether having breast biopsy would change the prognosis.  Pt 

understands and leaning towards less care but she is going to think baout it. 

she lives with sig. other at home.  Palliative consult has been placed 2 days 

prior. 


Placing OT consult and possible SNF vs.. rehab.

## 2020-01-28 NOTE — CON
DATE OF CONSULTATION:  01/27/2020



CHIEF COMPLAINT:  Swollen right breast with multiple adenopathies.



HISTORY OF PRESENT ILLNESS:  The patient is a 74-year-old female, who was admitted

on January 21st with complaints of fatigue, weakness.  She was found to have diffuse

lymphadenopathy in the left axilla, supraclavicular area, hilar adenopathy,

jose-pancreatic lymph nodes, and the common iliac chain, also noted on CT scan was

that her right breast was edematous and worrisome for inflammatory breast cancer.

The patient's last mammogram was about 5 years ago. 



PAST MEDICAL HISTORY:  Significant for chronic renal insufficiency, ischemic

colitis, deconditioning, diverticulosis, hyperlipidemia, hypertension, chronic

subdural hematoma, which she says she did have a fall couple of weeks ago, striking

her head. 



PAST SURGERIES:  Include hysterectomy.



MEDICATIONS:  

1. Amlodipine.

2. Klonopin.

3. Lovastatin.

4. Metoprolol.

5. Protonix.



ALLERGIES:  SHE IS ALLERGIC TO ACE INHIBITORS.



FAMILY HISTORY:  She has no family history of breast cancer.



SOCIAL HISTORY:  She lives in [place].  She recently quit smoking.  No alcohol.



PHYSICAL EXAMINATION:

VITAL SIGNS: Temperature 97, pulse 71, blood pressure is 85/54. 

GENERAL:  She is very frail, looks older than stated age, very lethargic. 

HEENT: She has palpable nodes in the cervical chain in supraclavicular region. 

LUNGS:  Clear. 

BREASTS:  She has edema to the lateral aspect of the right breast.  I do not feel a

definite mass.  There is some lymphadenopathy both axilla.  I do not feel any masses

on the left. 

HEART:  Regular rate and rhythm. 

ABDOMEN:  Soft, nondistended, nontender. 

EXTREMITIES: Her right arm is edematous.



ASSESSMENT:  Diffuse adenopathy with  __________



PLAN:  Mammogram. Core biopsy if something is found.  If nothing found, recommend

lymph node biopsy. 







Job ID:  [id number]

## 2020-01-28 NOTE — PRG
DATE OF SERVICE:  01/28/2020



The patient denies any pain.  On examination, she has pretty prominent lymph nodes

in right supraclavicular area.  Temperature is 97.9, pulse 72, blood pressure is

only 80/50. 



ASSESSMENT:  This patient has multiple very serious medical issues.  She has

cardiomyopathy with hypotension.  She has chronic renal insufficiency.  She has

hypotension.  She has subdural hematoma.  I cannot safely put her under anesthesia

to do a biopsy of these supraclavicular nodes.  I called Radiology about doing a

mammogram.  Unfortunately, there is no mammogram machine at this hospital.  The

machine is across the parking lot and she is not stable enough to transfer over

there for mammogram to try and do a breast biopsy.  So, at this point, supportive

measures.  If we are able to get her a little healthier, perhaps we can put her

under anesthesia to do a biopsy of one of these lymph nodes; however, Oncology may

not be able to do much as far as chemotherapy based on her other medical problems. 







Job ID:  [id number]

## 2020-01-29 LAB
ANION GAP SERPL CALC-SCNC: 11 MMOL/L (ref 10–20)
BUN SERPL-MCNC: 20 MG/DL (ref 9.8–20.1)
CALCIUM SERPL-MCNC: 7.1 MG/DL (ref 7.8–10.44)
CHLORIDE SERPL-SCNC: 112 MMOL/L (ref 98–107)
CO2 SERPL-SCNC: 14 MMOL/L (ref 23–31)
CREAT CL PREDICTED SERPL C-G-VRATE: 39 ML/MIN (ref 70–130)
GLUCOSE SERPL-MCNC: 70 MG/DL (ref 83–110)
HGB BLD-MCNC: 8.4 G/DL (ref 12–16)
MAGNESIUM SERPL-MCNC: 1.4 MG/DL (ref 1.6–2.6)
MCH RBC QN AUTO: 34.9 PG (ref 27–31)
MCV RBC AUTO: 103 FL (ref 78–98)
MDIFF COMPLETE?: YES
PLATELET # BLD AUTO: 42 THOU/UL (ref 130–400)
POTASSIUM SERPL-SCNC: 3.7 MMOL/L (ref 3.5–5.1)
RBC # BLD AUTO: 2.41 MILL/UL (ref 4.2–5.4)
SODIUM SERPL-SCNC: 133 MMOL/L (ref 136–145)
WBC # BLD AUTO: 9.9 THOU/UL (ref 4.8–10.8)

## 2020-01-29 RX ADMIN — ALBUMIN HUMAN SCH GM: 250 SOLUTION INTRAVENOUS at 15:33

## 2020-01-29 RX ADMIN — Medication SCH ML: at 22:47

## 2020-01-29 RX ADMIN — ALBUMIN HUMAN SCH GM: 250 SOLUTION INTRAVENOUS at 05:11

## 2020-01-29 RX ADMIN — Medication SCH ML: at 09:27

## 2020-01-29 NOTE — PDOC.HOSPP
- Subjective


Subjective: 





pt has sig wheezing, low BP since overnight, requiring small boluses, but then 

pulm edema with that -- systolic in 70s/60s.


talk to her sig..other this am, and he is kind of expecting the not-so-good 

prognosis.  he will meet w.. palliative this afternoon.


meanwhile, explained to pt, that she would not be able to go and get the 

mammogram outside.  Transferring to ICU -- vasopressors and escalating the care 

--not sure it would change the overall poor prognosis.


will cw current mgmt of IVF to keep SBP>85, trial of midodrine, lasix PRN and 

BD for more wheezing  -- on the floor, unless plan changed from palliative 

perspective. 








- Objective


Vital Signs & Weight: 


 Vital Signs (12 hours)











  Temp Pulse Resp BP Pulse Ox


 


 01/29/20 09:28   71   91/50 L 


 


 01/29/20 07:43  97.9 F  67  18  77/51 L  93 L


 


 01/29/20 04:38     60/40 L 


 


 01/29/20 04:00  97.2 F L  67  92 H  


 


 01/29/20 00:00  98 F  69  18  77/48 L  91 L








 Weight











Admit Weight                   145 lb


 


Weight                         145 lb 4.554 oz











 Most Recent Monitor Data











Heart Rate from ECG            68


 


NIBP                           93/57


 


NIBP BP-Mean                   69


 


Respiration from ECG           18


 


SpO2                           96














I&O: 


 











 01/28/20 01/29/20 01/30/20





 06:59 06:59 06:59


 


Intake Total 120 2681 


 


Output Total  190 


 


Balance 120 2491 











Result Diagrams: 


 01/29/20 04:23





 01/29/20 04:23


Additional Labs: 


 Accuchecks











  01/29/20 01/29/20





  01:37 00:59


 


POC Glucose  80  62 L














Hospitalist ROS





- Medication


Medications: 


Active Medications











Generic Name Dose Route Start Last Admin





  Trade Name Freq  PRN Reason Stop Dose Admin


 


Acetaminophen  1,000 mg  01/24/20 03:35  01/26/20 09:10





  Tylenol  PO   1,000 mg





  Q6H PRN   Administration





  Mild Pain (1-3)   





     





     





     


 


Albumin Human  25 gm  01/29/20 06:00  01/29/20 05:11





  Albumin 25%  IVPB  01/29/20 14:01  25 gm





  Q8HR TINO   Administration





     





     





     





     


 


Diphenoxylate HCl/Atropine  2 tab  01/28/20 18:09  01/28/20 19:38





  Lomotil  PO   2 tab





  TIDPRN PRN   Administration





  Diarrhea/Loose Stools   





     





     





     


 


Dronedarone  400 mg  01/24/20 17:00  01/29/20 09:25





  Multaq  PO   400 mg





  BID-WM TINO   Administration





     





     





     





     


 


Famotidine  20 mg  01/24/20 09:00  01/29/20 09:26





  Pepcid  PO   Not Given





  QAM TINO   





     





     





     





     


 


Potassium Chloride  40 meq  01/25/20 13:24  01/25/20 13:34





  K-Dur  PO   40 meq





  ASDIR PRN   Administration





  FOR SERUM K+  2.5 - 3.5   





     





     





     


 


Sodium Chloride  10 ml  01/24/20 21:00  01/29/20 09:27





  Flush - Normal Saline  IVF   10 ml





  Q12HR TINO   Administration





     





     





     





     


 


Tramadol HCl  50 mg  01/25/20 11:03  01/25/20 11:24





  Ultram  PO   50 mg





  Q6H PRN   Administration





  Moderate Pain (4-6)   





     





     





     














- Exam


General Appearance: NAD, awake alert


Eye: PERRL, anicteric sclera


ENT: normocephalic atraumatic


Neck: supple, symmetric


Heart: RRR


Respiratory: rales, wheezes


Gastrointestinal: non-tender, normal bowel sounds


Extremities: 1+ LE edema, 2+ LE edema


Extremities - other findings: right hand -- sig edema --dept


Skin - other findings: skin is very fragile, paper like





Hosp A/P





- Plan








(1) Brain mass





(2) Lymphadenopathy





(3) Suspected malignant neoplasm


Outside Ct scan for Gretchen reviewed by prior provider, shows extensive 

lymphadenopathy in the chest, abdomen, and pelvis. 


-  edematous change in the right breast, which is concerning for inflammatory 

breast CA.


CT head showsing extra-axial 0.7cm hematoma? This is more likely a mass as seen 

on MRI.


-  followed by  oncology and neurosugery reccs


-  breast bx is not done.


-appreciate sux input. [see below]





(4) Elevated troponin-type II





(5) Acute kidney injury superimposed on CKD





(6) Anemia in chronic kidney disease


-stable





(7) Hypertension


-norvasc


-controlled. 





(8) Atrial fibrillation with RVR


- resrtared on multaq and metoprolol


-cardiology following. 





Hypokalemia


-being replaced. 





Talk to sux, it appears there may not be breast lump, and if needed, have to 

get it from [facility] and followed by core bx, if mass do present.  Talk 

to the CM and pt cannot be sent from here but can be done as outpt.


i discussed at length with the patient about overall poor prognosis and 

uncertain whether having breast biopsy would change the prognosis.  Pt 

understands and leaning towards less care but she is going to think baout it. 

she lives with sig. other at home.  Palliative consult has been placed 2 days 

prior. 


Placing OT consult and possible SNF vs.. rehab.  





29th


pt has sig wheezing, low BP since overnight, requiring small boluses, but then 

pulm edema with that -- systolic in 70s/60s.


talk to her sig..other this am, and he is kind of expecting the not-so-good 

prognosis.  he will meet w.. palliative this afternoon.


meanwhile, explained to pt, that she would not be able to go and get the 

mammogram outside.  Transferring to ICU -- vasopressors and escalating the care 

--not sure it would change the overall poor prognosis.


will cw current mgmt of IVF to keep SBP>85, trial of midodrine, lasix PRN and 

BD for more wheezing  -- on the floor, unless plan changed from palliative 

perspective.

## 2020-01-29 NOTE — PDOC.EVN
Event Note





- Event Note


Event Note: 





BP low, slightly more than previous, has been low since admission, asymptomatic

, will give albumin and check lactic acid gave IVF but only caused worsening 

overload so held. follow up BP after albumin in AM and notify sound if not 

improved

## 2020-01-30 LAB
HGB BLD-MCNC: 8.5 G/DL (ref 12–16)
MACROCYTES BLD QL SMEAR: (no result) (100X)
MCH RBC QN AUTO: 34.8 PG (ref 27–31)
MCV RBC AUTO: 103 FL (ref 78–98)
MDIFF COMPLETE?: YES
PLATELET # BLD AUTO: 39 THOU/UL (ref 130–400)
RBC # BLD AUTO: 2.45 MILL/UL (ref 4.2–5.4)
WBC # BLD AUTO: 10.2 THOU/UL (ref 4.8–10.8)

## 2020-01-30 RX ADMIN — Medication SCH ML: at 21:20

## 2020-01-30 RX ADMIN — Medication SCH ML: at 09:20

## 2020-01-30 NOTE — PDOC.HOSPP
- Subjective


Subjective: 





spend sig time to explain all her current medical conditions and futility of 

aggressive med mgmt.  pt understands clearly. she is quite fatigued with her 

comorbidities.  Bp in 90s.





- Objective


Vital Signs & Weight: 


 Vital Signs (12 hours)











  Temp Pulse Pulse Pulse Resp BP BP


 


 01/30/20 10:50   70     


 


 01/30/20 09:56    70  69   90/54 L  81/49 L


 


 01/30/20 07:55  97.4 F L  70    18  


 


 01/30/20 04:00  97.6 F  70    16  


 


 01/30/20 00:00  97.6 F  72    16  














  BP Pulse Ox


 


 01/30/20 10:50  89/53 L  89 L


 


 01/30/20 09:56  


 


 01/30/20 07:55  91/54 L  88 L


 


 01/30/20 04:00  96/48 L  92 L


 


 01/30/20 00:00  95/54 L  92 L








 Weight











Admit Weight                   145 lb


 


Weight                         145 lb 4.554 oz











 Most Recent Monitor Data











Heart Rate from ECG            68


 


NIBP                           93/57


 


NIBP BP-Mean                   69


 


Respiration from ECG           18


 


SpO2                           96














I&O: 


 











 01/29/20 01/30/20 01/31/20





 06:59 06:59 06:59


 


Intake Total 2681 480 


 


Output Total 190 1200 


 


Balance 2491 -720 











Result Diagrams: 


 01/30/20 04:24





 01/29/20 04:23


Additional Labs: 


 Accuchecks











  01/30/20





  04:44


 


POC Glucose  81














Hospitalist ROS





- Medication


Medications: 


Active Medications











Generic Name Dose Route Start Last Admin





  Trade Name Freq  PRN Reason Stop Dose Admin


 


Acetaminophen  1,000 mg  01/24/20 03:35  01/29/20 15:33





  Tylenol  PO   1,000 mg





  Q6H PRN   Administration





  Mild Pain (1-3)   





     





     





     


 


Diphenoxylate HCl/Atropine  2 tab  01/28/20 18:09  01/28/20 19:38





  Lomotil  PO   2 tab





  TIDPRN PRN   Administration





  Diarrhea/Loose Stools   





     





     





     


 


Dronedarone  400 mg  01/24/20 17:00  01/30/20 09:19





  Multaq  PO   400 mg





  BID-WM TINO   Administration





     





     





     





     


 


Famotidine  20 mg  01/24/20 09:00  01/30/20 09:19





  Pepcid  PO   Not Given





  QAM TINO   





     





     





     





     


 


Midodrine  5 mg  01/29/20 21:00  01/30/20 09:19





  Proamatine  PO   5 mg





  BID TINO   Administration





     





     





     





     


 


Potassium Chloride  40 meq  01/25/20 13:24  01/25/20 13:34





  K-Dur  PO   40 meq





  ASDIR PRN   Administration





  FOR SERUM K+  2.5 - 3.5   





     





     





     


 


Sodium Chloride  10 ml  01/24/20 21:00  01/30/20 09:20





  Flush - Normal Saline  IVF   10 ml





  Q12HR TINO   Administration





     





     





     





     


 


Tramadol HCl  50 mg  01/25/20 11:03  01/25/20 11:24





  Ultram  PO   50 mg





  Q6H PRN   Administration





  Moderate Pain (4-6)   





     





     





     














- Exam


General Appearance: ill appearing


ENT: normocephalic atraumatic


Neck: symmetric


Heart: RRR, no murmur


Respiratory: CTAB


Gastrointestinal: non-tender, non-distended, normal bowel sounds


Extremities - other findings: dept edema and right arm/hand more swollen,


Skin - other findings: quite fragile


Neurological: no focal deficits





Hosp A/P





- Plan








(1) Brain mass





(2) Lymphadenopathy





(3) Suspected malignant neoplasm


Outside Ct scan for Gretchen reviewed by prior provider, shows extensive 

lymphadenopathy in the chest, abdomen, and pelvis. 


-  edematous change in the right breast, which is concerning for inflammatory 

breast CA.


CT head showsing extra-axial 0.7cm hematoma? This is more likely a mass as seen 

on MRI.


-  followed by  oncology and neurosugery reccs


-  breast bx is not done.


-appreciate sux input. [see below]





(4) Elevated troponin-type II





(5) Acute kidney injury superimposed on CKD





(6) Anemia in chronic kidney disease


-stable





(7) Hypertension


-norvasc


-controlled. 





(8) Atrial fibrillation with RVR


- resrtared on multaq and metoprolol


-cardiology following. 





Hypokalemia


-being replaced. 





current active issues


Fall


Hypotension


poss. inflammatory breast cancer 


sig..diffuse LAD incl Supraclvicular LAD, peripancreatic LAD


afib


SDH


CRI








Talk to sux, it appears there may not be breast lump, and if needed, have to 

get it from [facility] and followed by core bx, if mass do present.  Talk 

to the CM and pt cannot be sent from here but can be done as outpt.


i discussed at length with the patient about overall poor prognosis and 

uncertain whether having breast biopsy would change the prognosis.  Pt 

understands and leaning towards less care but she is going to think baout it. 

she lives with sig. other at home.  Palliative consult has been placed 2 days 

prior. 


Placing OT consult and possible SNF vs.. rehab.  





29th


pt has sig wheezing, low BP since overnight, requiring small boluses, but then 

pulm edema with that -- systolic in 70s/60s.


talk to her sig..other this am, and he is kind of expecting the not-so-good 

prognosis.  he will meet w.. palliative this afternoon.


meanwhile, explained to pt, that she would not be able to go and get the 

mammogram outside.  Transferring to ICU -- vasopressors and escalating the care 

--not sure it would change the overall poor prognosis.


will cw current mgmt of IVF to keep SBP>85, trial of midodrine, lasix PRN and 

BD for more wheezing  -- on the floor, unless plan changed from palliative 

perspective. 








30th


-onc note reviewed.  I have explained to the pt and re-iterated all her current 

medical co-morbidities as listed above. 





continue palliative care and possible SNF evaluation if pt agrees to go to SNF.

  it appears her sig.other may not be able to provide adequate ADL care.  PT 

and CM c/s placed/

## 2020-01-30 NOTE — PDOC.MOPN
Interval History: 





patient states she can sit up because she gets dizzy. Poor appetite





- Vital Signs


Vital Signs: 


 Vital Signs (12 hours)











  Temp Pulse Pulse Pulse Resp BP BP


 


 01/30/20 10:50   70     


 


 01/30/20 09:56    70  69   90/54 L  81/49 L


 


 01/30/20 07:55  97.4 F L  70    18  


 


 01/30/20 04:00  97.6 F  70    16  


 


 01/30/20 00:00  97.6 F  72    16  














  BP Pulse Ox


 


 01/30/20 10:50  89/53 L  89 L


 


 01/30/20 09:56  


 


 01/30/20 07:55  91/54 L  88 L


 


 01/30/20 04:00  96/48 L  92 L


 


 01/30/20 00:00  95/54 L  92 L








 Weight











Admit Weight                   145 lb


 


Weight                         145 lb 4.554 oz











 Most Recent Monitor Data











Heart Rate from ECG            68


 


NIBP                           93/57


 


NIBP BP-Mean                   69


 


Respiration from ECG           18


 


SpO2                           96

















- Physical Exam


General: Alert, Oriented x3, No acute distress


HEENT: Atraumatic, PERRLA, EOMI, Mucous membr. moist/pink


Lungs: Clear to auscultation, Normal air movement


Cardiovascular: Regular rate, Normal S1, Normal S2, No murmurs, Gallops, Rubs


Abdomen: Normal bowel sounds, Soft, No tenderness, No hepatospenomegaly, No 

masses


Extremities: No clubbing, No cyanosis, No edema, Normal pulses, No tenderness/

swelling


Neurological: Normal speech





- Labs


Result Diagrams: 


 01/30/20 04:24





 01/29/20 04:23


Lab results: 


 Laboratory Results - last 24 hr





01/30/20 04:44: POC Glucose 81


01/30/20 04:24: WBC 10.2, RBC 2.45 L, Hgb 8.5 L, Hct 25.2 L, .0 H, MCH 

34.8 H, MCHC 33.9, RDW 13.6, Plt Count 39 L, MPV 9.3, Neutrophils % (Manual) 58

, Band Neuts % (Manual) 14 H, Lymphocytes % (Manual) 26, Monocytes % (Manual) 2

, Hypochromia SLIGHT = 6-15 cells, Plt Morphology Comment Appears Decreased L, 

Macrocytosis SLIGHT = 6-15 cells








Status: lab reviewed by me





A/P





- Problem


(1) Lymphadenopathy


Current Visit: Yes   Code(s): R59.1 - GENERALIZED ENLARGED LYMPH NODES   Status

: Acute   





- Plan


Plan: 





discussed her weakness in regards to diagnosis and treatment


Unclear if she really understands gravity of possible diagnosis.


BP remains too low for biopsy


Will check flow cytometry and general tumor markers


Recommend snf

## 2020-01-30 NOTE — PDOC.EVN
Event Note





- Event Note


Event Note: 





I have explained the current medical condnn including prob breast cancer, 

though pt seems to understand -- still wants to know the specifics.  without 

oncology definite input, not much to discuss.  with her current health condin.. 

she is not a strong candidate for aggressive intervention incl..chemo.   i 

clearly stated that to the pt and to her sig, other, when i talk to him y'day 

morning over jared phone.  still they are not clear.  it appears that they are 

not understanding her medical condition.  palliative is involved.  Hopefully we 

can help them to understand her prognosis.

## 2020-01-31 LAB
ANION GAP SERPL CALC-SCNC: 15 MMOL/L (ref 10–20)
BUN SERPL-MCNC: 20 MG/DL (ref 9.8–20.1)
CALCIUM SERPL-MCNC: 8.3 MG/DL (ref 7.8–10.44)
CHLORIDE SERPL-SCNC: 108 MMOL/L (ref 98–107)
CK MB SERPL-MCNC: 1 NG/ML (ref 0–6.6)
CO2 SERPL-SCNC: 17 MMOL/L (ref 23–31)
CREAT CL PREDICTED SERPL C-G-VRATE: 36 ML/MIN (ref 70–130)
GLUCOSE SERPL-MCNC: 79 MG/DL (ref 83–110)
HGB BLD-MCNC: 9.3 G/DL (ref 12–16)
MCH RBC QN AUTO: 34.4 PG (ref 27–31)
MCV RBC AUTO: 103 FL (ref 78–98)
MDIFF COMPLETE?: YES
PLATELET # BLD AUTO: 34 THOU/UL (ref 130–400)
POTASSIUM SERPL-SCNC: 3.7 MMOL/L (ref 3.5–5.1)
RBC # BLD AUTO: 2.71 MILL/UL (ref 4.2–5.4)
SODIUM SERPL-SCNC: 136 MMOL/L (ref 136–145)
TOXIC GRANULES BLD QL SMEAR: SLIGHT
WBC # BLD AUTO: 13.8 THOU/UL (ref 4.8–10.8)

## 2020-01-31 RX ADMIN — Medication SCH ML: at 21:06

## 2020-01-31 RX ADMIN — VANCOMYCIN HYDROCHLORIDE SCH MLS: 1 INJECTION, SOLUTION INTRAVENOUS at 17:13

## 2020-01-31 RX ADMIN — Medication SCH ML: at 08:54

## 2020-01-31 NOTE — PDOC.MOPN
Interval History: 





complains of weakness





- Vital Signs


Vital Signs: 


 Vital Signs (12 hours)











  Temp Pulse Resp BP Pulse Ox


 


 01/31/20 12:39     86/53 L  94 L


 


 01/31/20 12:00  97.9 F  73  16  85/49 L  92 L


 


 01/31/20 08:00  97.6 F  76  16  87/57 L  92 L


 


 01/31/20 05:18  97.7 F  79  16  84/50 L  95








 Weight











Admit Weight                   145 lb


 


Weight                         145 lb 4.554 oz











 Most Recent Monitor Data











Heart Rate from ECG            68


 


NIBP                           93/57


 


NIBP BP-Mean                   69


 


Respiration from ECG           18


 


SpO2                           96

















- Physical Exam


General: Alert


HEENT: Atraumatic


Lungs: Clear to auscultation


Abdomen: Normal bowel sounds


Extremities: Other (ble edema)


Neurological: Normal speech





- Labs


Result Diagrams: 


 01/31/20 01:32





 01/31/20 01:31


Lab results: 


 Laboratory Results - last 24 hr





01/31/20 01:32: WBC 13.8 H, RBC 2.71 L, Hgb 9.3 L, Hct 28.0 L, .0 H, MCH 

34.4 H, MCHC 33.3, RDW 13.8, Plt Count 34 L, MPV 9.1, Neutrophils % (Manual) 65

, Band Neuts % (Manual) 13 H, Lymphocytes % (Manual) 14 L, Reactive Lymphs % 3, 

Monocytes % (Manual) 4, Myelocytes % 1 H, Nucleated RBCs # (Man) 1 H, Toxic 

Granulation SLIGHT, Plt Morphology Comment Appears Decreased L


01/31/20 01:31: B-Natriuretic Peptide 164.9 H


01/31/20 01:31: CK-MB (CK-2) 1.0, Troponin I 0.048 H


01/31/20 01:31: Sodium 136, Potassium 3.7, Chloride 108 H, Carbon Dioxide 17 L, 

Anion Gap 15, BUN 20, Creatinine 1.44 H, Estimated GFR (MDRD) 36, Glucose 79 L, 

Calcium 8.3


01/30/20 11:59: CA 27-29 24.1








Status: lab reviewed by me





A/P





- Problem


(1) Lymphadenopathy


Current Visit: Yes   Code(s): R59.1 - GENERALIZED ENLARGED LYMPH NODES   Status

: Acute   





- Plan


Plan: 





flow pending


CEA mildly elevated


Long conversions with patient and significant other about cancer diagnosis. 

Patient


remains too weak for biopsy. Will add steroids today. hopefully, she will 

improve over


the weekend and can get tissue on Monday. SNF placement upon discharge.

## 2020-01-31 NOTE — RAD
Exam:

Chest one view:



HISTORY:

Low O2 sats



COMPARISON:

1/23/2020 CT and chest one view



FINDINGS:

Worsening pleural and parenchymal opacity changes in the right base. Mild increased markings in the l
eft chest, stable. Heart size is within normal limits.



IMPRESSION:

Increased right pleural and parenchymal changes in the right lower chest evidence for some effusion a
nd possibly associated pneumonia.



Reported By: Ge Lagunas 

Electronically Signed:  1/31/2020 7:48 AM

## 2020-01-31 NOTE — PDOC.HOSPP
- Subjective


Encounter Date: 01/31/20


Encounter Time: 11:00


Subjective: 





patient evaluated for generalize weakness, brain mass breast mass and 

generalize lymphadenopathy, patient is complaining of worsening sob increased 

cough and sputum production, patient requiring increased 02 supplementation now 

at 6L, denies chest pain fever or chills does refer general malaise





- Objective


Vital Signs & Weight: 


 Vital Signs (12 hours)











  Temp Pulse Resp BP BP Pulse Ox


 


 01/31/20 18:00  97.3 F L  73  20  110/63   96


 


 01/31/20 17:30       95


 


 01/31/20 16:36  98.6 F  74  18   92/57 L  92 L


 


 01/31/20 12:39      86/53 L  94 L


 


 01/31/20 12:00  97.9 F  73  16   85/49 L  92 L


 


 01/31/20 08:00  97.6 F  76  16   87/57 L  92 L








 Weight











Admit Weight                   145 lb


 


Weight                         145 lb 4.554 oz











 Most Recent Monitor Data











Heart Rate from ECG            71


 


NIBP                           100/62


 


NIBP BP-Mean                   74


 


Respiration from ECG           21


 


SpO2                           95














I&O: 


 











 01/30/20 01/31/20 02/01/20





 06:59 06:59 06:59


 


Intake Total 480 300 250


 


Output Total 1200 350 0


 


Balance -720 -50 250











Result Diagrams: 


 01/31/20 01:32





 01/31/20 01:31





Hospitalist ROS





- Review of Systems


All other systems reviewed; all pertinent +/- noted in HPI/Subj





- Medication


Medications: 


Active Medications











Generic Name Dose Route Start Last Admin





  Trade Name Freq  PRN Reason Stop Dose Admin


 


Acetaminophen  1,000 mg  01/24/20 03:35  01/31/20 05:47





  Tylenol  PO   1,000 mg





  Q6H PRN   Administration





  Mild Pain (1-3)   





     





     





     


 


Albuterol/Ipratropium  3 ml  01/29/20 11:50  01/31/20 01:13





  Duoneb  NEB   3 ml





  Q4H PRN   Administration





  .WHEEZING   





     





     





     


 


Dexamethasone  4 mg  01/31/20 17:00  01/31/20 16:15





  Decadron  PO   4 mg





  BID-WM TINO   Administration





     





     





     





     


 


Diphenoxylate HCl/Atropine  2 tab  01/28/20 18:09  01/28/20 19:38





  Lomotil  PO   2 tab





  TIDPRN PRN   Administration





  Diarrhea/Loose Stools   





     





     





     


 


Dronedarone  400 mg  01/24/20 17:00  01/31/20 16:15





  Multaq  PO   400 mg





  BID-WM TINO   Administration





     





     





     





     


 


Famotidine  20 mg  01/24/20 09:00  01/31/20 08:34





  Pepcid  PO   Not Given





  QAM TINO   





     





     





     





     


 


Vancomycin HCl 1 gm/ Device  200 mls @ 200 mls/hr  01/31/20 16:00  01/31/20 17:

13





  IVPB   200 mls





  1600 TINO   Administration





     





     





     





     


 


Midodrine  5 mg  01/29/20 21:00  01/31/20 08:49





  Proamatine  PO   5 mg





  BID TINO   Administration





     





     





     





     


 


Sodium Chloride  10 ml  01/24/20 21:00  01/31/20 08:54





  Flush - Normal Saline  IVF   10 ml





  Q12HR TINO   Administration





     





     





     





     


 


Tramadol HCl  50 mg  01/25/20 11:03  01/31/20 12:34





  Ultram  PO   50 mg





  Q6H PRN   Administration





  Moderate Pain (4-6)   





     





     





     














- Exam


General Appearance: ill appearing


Eye: PERRL, anicteric sclera


ENT: normocephalic atraumatic, no oropharyngeal lesions


Neck: supple, symmetric, no JVD


Heart: RRR, no murmur, no gallops, no rubs


Respiratory: no wheezes, no rales, no ronchi, tachypneic


Gastrointestinal: soft, non-tender, non-distended, normal bowel sounds


Extremities: no cyanosis, no clubbing


Neurological: cranial nerve grossly intact


Psychiatric: normal affect, normal behavior, A&O x 3





Hosp A/P


(1) Breast mass


Code(s): N63.0 - UNSPECIFIED LUMP IN UNSPECIFIED BREAST   Status: Acute   





(2) Lymphadenopathy


Code(s): R59.1 - GENERALIZED ENLARGED LYMPH NODES   Status: Acute   





(3) Healthcare-associated pneumonia


Code(s): J18.9 - PNEUMONIA, UNSPECIFIED ORGANISM   Status: Acute   





(4) Acute kidney injury superimposed on CKD


Code(s): N17.9 - ACUTE KIDNEY FAILURE, UNSPECIFIED; N18.9 - CHRONIC KIDNEY 

DISEASE, UNSPECIFIED   Status: Acute   





(5) Suspected malignant neoplasm


Code(s): R68.89 - OTHER GENERAL SYMPTOMS AND SIGNS   Status: Acute   





(6) Thrombocytopenia


Code(s): D69.6 - THROMBOCYTOPENIA, UNSPECIFIED   Status: Acute   





(7) Atrial fibrillation with RVR


Code(s): I48.91 - UNSPECIFIED ATRIAL FIBRILLATION   Status: Acute   





(8) Elevated troponin


Code(s): R79.89 - OTHER SPECIFIED ABNORMAL FINDINGS OF BLOOD CHEMISTRY   Status

: Acute   





- Plan


- due to cxr findings in an immuno compromise pt will start treatment with 

cefepimen and vanc for hc associated pna, will also send procalcitonin to 

confirm bacterial origen 


-transfer to [facility]cu due to increased oxygen requirements and hypotension


-monitor b/p and start levophed in map decreases below 65


-f/u oncologist recommendations for evaluation of breast mass and 

lympadenopathy. refers patient is too weak to biopsy and tx at this time


-off pharmacolgic dvt prophylaxis due to thrombocytopenia


-poor prognosis 


-continue current tx for chronic conditons

## 2020-02-01 LAB
ANION GAP SERPL CALC-SCNC: 16 MMOL/L (ref 10–20)
BACTERIA UR QL AUTO: (no result) HPF
BUN SERPL-MCNC: 28 MG/DL (ref 9.8–20.1)
CALCIUM SERPL-MCNC: 8.5 MG/DL (ref 7.8–10.44)
CHLORIDE SERPL-SCNC: 107 MMOL/L (ref 98–107)
CO2 SERPL-SCNC: 15 MMOL/L (ref 23–31)
CREAT CL PREDICTED SERPL C-G-VRATE: 32 ML/MIN (ref 70–130)
GLUCOSE SERPL-MCNC: 91 MG/DL (ref 83–110)
HGB BLD-MCNC: 9.7 G/DL (ref 12–16)
LEUKOCYTE ESTERASE UR QL STRIP.AUTO: 75 LEU/UL
MACROCYTES BLD QL SMEAR: (no result) (100X)
MANUAL DIF COMMENT BLD-IMP: (no result)
MCH RBC QN AUTO: 35.4 PG (ref 27–31)
MCV RBC AUTO: 105 FL (ref 78–98)
MDIFF COMPLETE?: YES
PLATELET # BLD AUTO: 35 THOU/UL (ref 130–400)
POTASSIUM SERPL-SCNC: 4.3 MMOL/L (ref 3.5–5.1)
PROT UR STRIP.AUTO-MCNC: 50 MG/DL
RBC # BLD AUTO: 2.73 MILL/UL (ref 4.2–5.4)
RBC UR QL AUTO: (no result) HPF (ref 0–3)
SODIUM SERPL-SCNC: 134 MMOL/L (ref 136–145)
TOXIC GRANULES BLD QL SMEAR: SLIGHT
WBC # BLD AUTO: 18.2 THOU/UL (ref 4.8–10.8)

## 2020-02-01 RX ADMIN — VANCOMYCIN HYDROCHLORIDE SCH MLS: 1 INJECTION, SOLUTION INTRAVENOUS at 17:01

## 2020-02-01 RX ADMIN — Medication SCH ML: at 09:32

## 2020-02-01 RX ADMIN — Medication SCH ML: at 19:57

## 2020-02-01 NOTE — PDOC.HOSPP
- Subjective


Encounter Date: 02/01/20


Encounter Time: 17:50


Subjective: 





f/u for generalized lymphadenopathy, malaise, weakness treated with Vancomycin/

Cefepime/Fluconazole. 





- Objective


Vital Signs & Weight: 


 Vital Signs (12 hours)











  Temp Pulse Pulse BP BP Pulse Ox Pulse Ox


 


 02/01/20 12:00  97.4 F L      


 


 02/01/20 09:35   71  77  88/59 L  90/56 L   92 L


 


 02/01/20 07:32       93 L 


 


 02/01/20 07:00  97.3 F L      














  Pulse Ox


 


 02/01/20 12:00 


 


 02/01/20 09:35  94 L


 


 02/01/20 07:32 


 


 02/01/20 07:00 








 Weight











Admit Weight                   145 lb


 


Weight                         141 lb 5.061 oz











 Most Recent Monitor Data











Heart Rate from ECG            68


 


NIBP                           88/59


 


NIBP BP-Mean                   68


 


Respiration from ECG           24


 


SpO2                           96














I&O: 


 











 01/31/20 02/01/20 02/02/20





 06:59 06:59 06:59


 


Intake Total 300 690 290


 


Output Total 350 150 200


 


Balance -50 540 90











Result Diagrams: 


 02/01/20 03:48





 02/01/20 03:48


Additional Labs: 





Microbiology





01/27/20 10:47   Stool   Stool Occult Blood ([facility]) - Final


01/23/20 18:35   Venous blood - Left Hand   Blood Culture - Final


                              NO GROWTH IN 5 DAYS


01/23/20 18:30   Venous blood - Left Arm   Blood Culture - Final


                              NO GROWTH IN 5 DAYS





 Laboratory Tests











  01/26/20 01/29/20 01/30/20





  04:52 04:23 04:24


 


WBC   9.9  10.2


 


Hgb   8.4 L  8.5 L


 


Carbon Dioxide   


 


BUN   


 


Creatinine   


 


B-Natriuretic Peptide   


 


CA 27-29   


 


Procalcitonin   


 


Cortisol  18.00  














  01/30/20 01/31/20 01/31/20





  11:59 01:31 01:31


 


WBC   


 


Hgb   


 


Carbon Dioxide   17 L 


 


BUN   20 


 


Creatinine   1.44 H 


 


B-Natriuretic Peptide    164.9 H


 


CA 27-29  24.1  


 


Procalcitonin   


 


Cortisol   














  01/31/20 02/01/20





  01:32 03:48


 


WBC  13.8 H 


 


Hgb  9.3 L 


 


Carbon Dioxide  


 


BUN  


 


Creatinine  


 


B-Natriuretic Peptide  


 


CA 27-29  


 


Procalcitonin   2.42


 


Cortisol  











Radiology Reviewed by me: Yes (PCXR - R-sided pleural changes)


EKG Reviewed by me: Yes (Tele - SR)





Hospitalist ROS





- Medication


Medications: 


Active Medications











Generic Name Dose Route Start Last Admin





  Trade Name Freq  PRN Reason Stop Dose Admin


 


Acetaminophen  1,000 mg  01/24/20 03:35  01/31/20 21:06





  Tylenol  PO   1,000 mg





  Q6H PRN   Administration





  Mild Pain (1-3)   





     





     





     


 


Albuterol/Ipratropium  3 ml  01/29/20 11:50  01/31/20 22:02





  Duoneb  NEB   3 ml





  Q4H PRN   Administration





  .WHEEZING   





     





     





     


 


Dexamethasone  4 mg  01/31/20 17:00  02/01/20 17:01





  Decadron  PO   4 mg





  BID-WM TINO   Administration





     





     





     





     


 


Diphenoxylate HCl/Atropine  2 tab  01/28/20 18:09  01/28/20 19:38





  Lomotil  PO   2 tab





  TIDPRN PRN   Administration





  Diarrhea/Loose Stools   





     





     





     


 


Dronedarone  400 mg  01/24/20 17:00  02/01/20 17:01





  Multaq  PO   400 mg





  BID-WM TINO   Administration





     





     





     





     


 


Famotidine  20 mg  01/24/20 09:00  02/01/20 09:32





  Pepcid  PO   20 mg





  QAM TINO   Administration





     





     





     





     


 


Sodium Chloride  250 mls @ 999 mls/hr  01/29/20 11:47  01/31/20 22:50





  Normal Saline 0.9% 250 Ml Bag  IVPB   250 mls





  Q4H PRN   Administration





  SBP <85   





     





     





     


 


Cefepime HCl 1 gm/ Sodium  100 mls @ 200 mls/hr  02/01/20 15:00  02/01/20 16:57





  Chloride  IVPB   100 mls





  1500 TINO   Administration





     





     





     





     


 


Vancomycin HCl 1 gm/ Device  200 mls @ 200 mls/hr  01/31/20 16:00  02/01/20 17:

01





  IVPB   200 mls





  1600 TINO   Administration





     





     





     





     


 


Fluconazole/Sodium Chloride  100 mls @ 100 mls/hr  02/01/20 16:00  02/01/20 17:

00





  200 mg/ Device  IVPB  02/01/20 18:00  100 mls





  NOW TINO   Administration





     





     





     





     


 


Midodrine  5 mg  01/29/20 21:00  02/01/20 09:32





  Proamatine  PO   5 mg





  BID TINO   Administration





     





     





     





     


 


Sodium Chloride  10 ml  01/24/20 21:00  02/01/20 09:32





  Flush - Normal Saline  IVF   10 ml





  Q12HR TINO   Administration





     





     





     





     


 


Tramadol HCl  50 mg  01/25/20 11:03  01/31/20 12:34





  Ultram  PO   50 mg





  Q6H PRN   Administration





  Moderate Pain (4-6)   





     





     





     














- Exam


General Appearance: ill appearing


General - other findings: grimaces


Eye: PERRL, anicteric sclera


ENT: normocephalic atraumatic, no oropharyngeal lesions


Neck: supple, symmetric, no JVD, no thyromegaly


Heart: RRR, no gallops, no rubs, normal peripheral pulses


Respiratory - other findings: diminished with basilar coarse sounds


Gastrointestinal: soft, non-distended, normal bowel sounds, no palpable masses


Extremities: no cyanosis


Extremities - other findings: 3+ edema


Skin: normal turgor


Musculoskeletal: generalized weakness


Psychiatric: somnolent, lethargic





Hosp A/P


(1) Acute respiratory failure with hypoxia


Code(s): J96.01 - ACUTE RESPIRATORY FAILURE WITH HYPOXIA   Status: Acute   


Plan: 


Continue O2 supplementation, continue Cefepime/Fluconazole/Vancomycin








(2) Healthcare-associated pneumonia


Code(s): J18.9 - PNEUMONIA, UNSPECIFIED ORGANISM   Status: Acute   


Plan: 


Continue Duonebs, Cefepime/Vancomycin








(3) Acute kidney injury superimposed on CKD


Code(s): N17.9 - ACUTE KIDNEY FAILURE, UNSPECIFIED; N18.9 - CHRONIC KIDNEY 

DISEASE, UNSPECIFIED   Status: Acute   


Plan: 


Avoid nephrotoxic meds and limit contrast exposure, serial creatinine








(4) Lymphadenopathy


Code(s): R59.1 - GENERALIZED ENLARGED LYMPH NODES   Status: Acute   


Plan: 


Suspicious for lymphoma, Med oncology follow up








(5) Suspected malignant neoplasm


Code(s): R68.89 - OTHER GENERAL SYMPTOMS AND SIGNS   Status: Acute   


Plan: 


No definitive bx to date, suspected B-cell lymphoproliferative disease by flow 

cytometry








(6) Hypotension


Status: Acute   


Plan: 


? etiology, hold BP meds, continue Midodrine








(7) Hypoalbuminemia due to protein-calorie malnutrition


Code(s): E46 - UNSPECIFIED PROTEIN-CALORIE MALNUTRITION   Status: Acute   


Plan: 


Nepro, Mighty Shake, Reg Diet








(8) Macrocytic anemia


Code(s): D53.9 - NUTRITIONAL ANEMIA, UNSPECIFIED   Status: Chronic   


Plan: 


Chronic and stable, consider evaluating B12/Folate levels








- Plan


continue antibiotics, PT/OT, , out of bed/ambulate, DVT proph w/

SCDs





Continue supportive mgmt


Continue Midodrine


Await actual tissue bx


PT/OT for mobilization


CM for SNF options

## 2020-02-01 NOTE — PRG
DATE OF SERVICE:  02/01/2020



SERVICE:  Pulmonary Medicine.



INTERVAL HISTORY:  The patient is doing poorly from a blood pressure standpoint.

She has very poor urine output.  She has gross anasarca.  She is sitting in bed,

moaning.  She has very poor functional status.  Her blood pressures are 
marginal at

best.  Otherwise, there has been no interval change to her condition.  She

fevers overnight.  I have reviewed multiple different imaging studies to

help make sense of what has been going on here recently.  The patient cannot 
provide

me with any additional elements of this history. 



PHYSICAL EXAMINATION:

VITAL SIGNS:  Afebrile, pulse 68, blood pressure 88/59, respirations 24, 
saturation

96%, currently on 2 L nasal cannula. 

GENERAL:  The patient is awake and alert.  She has no distress, but whenever you

touch her, she takes a deep breath, and moans with exhalation. 

HEENT:  Normocephalic and atraumatic.  Sclerae white.  Conjunctivae pink.  Oral

mucosa is dry.  She has a very dense plaque that is easily scraped from the 
tongue

consistent with thrush.  No cervical lymphadenopathy. 



LABORATORY DATA:  WBC 18.2 and uptrending, hemoglobin 9.7, platelets 35,000,

remaining quite low.  Band count is 15% and rising on top of 62% neutrophils.

Lymphocytes are 10%.  Monocytes are 4%.  Creatinine uptrending to 1.62, BUN 28,

bicarb 15.  Basic metabolic profile is otherwise unremarkable.  Procalcitonin 
2.42,

cortisol 18.  Flow cytometry demonstrates findings consistent with a monoclonal

B-cell proliferative disorder like lymphoma. 



IMAGING DATA:  Chest x-ray demonstrates a right-sided pleural effusion.  There 
is

possibly a layering effusion versus an actual infiltrate at the right base.  
Cardiac

silhouette is quite small.  Otherwise, no lung disease is identified. 



ASSESSMENT:  

1. Acute hypoxic respiratory failure.

2. Lymphoma based on flow cytometry.

3. Extra-axial hematoma of the left parietal region (this is not subdural 
disease).

4. Pachymeningeal enhancement with dural, skull, and subgaleal

thickening, possibly consistent with a neoplastic process. 

5. Breast mass/nodule? I could not appreciate one. 

6. Thrush.



DISCUSSION AND PLAN:  We will continue her antibiotics including cefepime and

vancomycin.  That being said, all culture results remain negative to date.  I 
will

put her on fluconazole.  This will be a 10-day course.  This is for the thrush, 
but

there is a really good chance that with lymphoma, the patient is 
immunocompromised

and developed a systemic fungal infection.  Perhaps this is why she is not 
behaving

with such severe systemic inflammatory response syndrome.  I will empirically 
put

her on fluconazole.  We will get blood cultures x2, urinalysis, urine culture.  
I

will repeat a chest x-ray tomorrow morning to make certain the effusion versus

infiltrate in the right base is not evolving.  Other antibiotics will be 
continued.

Supportive measures will be continued.  I agree with making an attempt at 
minimizing

fluids through time, but if her creatinine continues to rise into tomorrow, we 
may

be forced to give her something to expand her intravascular space.  Pulmonary 
will

follow. 







Job ID:  [id number]



MTDD

## 2020-02-02 RX ADMIN — Medication SCH ML: at 09:14

## 2020-02-02 RX ADMIN — ALBUMIN HUMAN SCH GM: 250 SOLUTION INTRAVENOUS at 20:03

## 2020-02-02 RX ADMIN — ALBUMIN HUMAN SCH GM: 250 SOLUTION INTRAVENOUS at 15:59

## 2020-02-02 RX ADMIN — Medication SCH ML: at 20:09

## 2020-02-02 RX ADMIN — ALBUMIN HUMAN SCH GM: 250 SOLUTION INTRAVENOUS at 09:12

## 2020-02-02 NOTE — PRG
DATE OF SERVICE:  02/02/2020



SERVICE:  Pulmonary Medicine.



INTERVAL HISTORY:  The patient is doing poorly from a respiratory standpoint.  Her

blood pressures remain a little bit marginal.  She denies having any shortness of

breath or chest discomfort.  Urine output has dropped off a little bit.  Otherwise,

there has been no interval change to her condition.  She got a dose of albumin this

morning and her blood pressures have temporarily perked up. 



PHYSICAL EXAMINATION:

VITAL SIGNS:  Afebrile; pulse of 56; blood pressure 80/48; respirations 21; and

saturation 92%, currently on 1 L nasal cannula. 

GENERAL:  The patient is awake and alert, in no apparent distress. 

LUNGS:  Good air entry.  Rhonchi and crackles are both present.  No prolonged

expiratory phase or wheezing is appreciated. 

HEART:  Normal rate, regular. 

ABDOMEN:  Soft, nontender, and nondistended.  Bowel sounds are positive. 

MUSCULOSKELETAL:  No cyanosis or clubbing.  There is diffuse pitting throughout.



LABORATORY DATA:  Creatinine 1.64, BUN 28.  Basic metabolic profile is otherwise

unremarkable.  Procalcitonin 2.4.  Blood cultures x2 and stool cultures are negative

to date. 



IMAGING:  Chest x-ray demonstrates bilateral perihilar opacities are progressing.

Right-sided pleural effusion is noted.  Compared to prior, there is slight interval

improvement. 



ASSESSMENT:  

1. Acute hypoxic respiratory failure, mild.

2. Lymphoma based on flow cytometry.

3. Extra-axial hematoma of the left parietal region (not subdural disease).

4. Pachymeningeal enhancement with thickening of the dura and subgaleal spaces,

possibly consistent with metastatic process. 

5. Thrush.



DISCUSSION AND PLAN:  At this point, her nutrition is deplorable.  We will put in a

Dobhoff tube, so we can initiate trickle feeds.  Decadron will be stopped as we were

really not dealing with a massive intracranial mass causing mass effect here.  We

will put her on stress doses of steroids with hydrocortisone.  I will switch over

her Diflucan over to p.o.  We will get a right upper extremity ultrasound to invest

the localized edema there.  She remains critically ill.  She will stay in the ICU

for the time being.  In her current situation, she is certainly not a candidate for

lymphoma therapy.  That being said, if systemic fungal infection is at the root of

her illness, she may make some degree of recovery over the next 24 to 48 hours and I

would be inclined to give her that opportunity. 







Job ID:  [id number]

## 2020-02-02 NOTE — RAD
RADIOGRAPH CHEST 1 VIEW:



DATE:

2/2/2020



TIME:

1:09 PM



HISTORY:

Readjustment of Dobbhoff feeding tube in 74-year-old female.



COMPARISON:

2/2/2020

1:04 PM



FINDINGS:

Dobbhoff feeding tube has been readjusted, such that it is now vertically oriented with distal tip at
 midline, overlying the expected location of distal esophagus, slightly superior to diaphragm.

Small right pleural effusion. No change in appearance of the lungs.



IMPRESSION:

Manipulation of Dobbhoff feeding tube out of the left lower lobe bronchus and into the distal esophag
us.



Reported By: Hamzah Amador 

Electronically Signed:  2/2/2020 2:09 PM

## 2020-02-02 NOTE — RAD
RADIOGRAPH CHEST 1 VIEW:



DATE:

2/2/2020



TIME:

1:04 PM



HISTORY:

Dobbhoff tube placement



COMPARISON:

2/2/2020

4:56 AM



FINDINGS:

There is a new Dobbhoff feeding tube with distal portion obliquely oriented overlying the left lower 
lung zone, distal tip near left base. No interval change in appearance of the left lung. Lateral

half of right lung excluded from field-of-view.



IMPRESSION:

Dobbhoff feeding tube inserted into bronchus, probably a peripheral branch of the left lower lobe bro
nchus.



Reported By: Hamzah Amador 

Electronically Signed:  2/2/2020 2:07 PM

## 2020-02-02 NOTE — ULT
Ultrasound Doppler duplex venous right upper extremity:



HISTORY:

74-year-old female with right upper extremity swelling.



TECHNIQUE:

Grayscale, color-flow, and spectral analysis, of major veins of right upper extremities. Compression 
and release applied to all veins except the subclavian.



FINDINGS:

There is soft tissue edema in both the arm and forearm. The basilic vein is not visualized. There is 
patency with no thrombosis of the internal jugular, subclavian, axillary, brachial, cephalic,

radial, and ulnar veins.



IMPRESSION:

1. Soft tissue edema of left upper extremity.

2. Left basilic vein not visualized.

3. No thrombosis of the rest of the veins.



Reported By: Hamzah Amador 

Electronically Signed:  2/2/2020 2:39 PM

## 2020-02-02 NOTE — RAD
PORTABLE CHEST ONE VIEW:

 

HISTORY:

Follow up pleural effusion.

 

COMPARISON:

01/31/2020

 

FINDINGS:

Progressive bilateral vascular congestion with some patchy interstitial and alveolar opacity changes 
in a perihilar distribution. Persistent right pleural effusion. Slight left costophrenic angle blunti
ng. Heart size is normal.

 

IMPRESSION:

1. Worsening bilateral perihilar opacity changes since prior study

 

2. Right pleural effusion.

 

3. Slight left costophrenic angle blunting.

 

Continue short-term followup.

 

POS: EVERT

## 2020-02-02 NOTE — PDOC.HOSPP
- Subjective


Encounter Date: 02/02/20


Encounter Time: 08:15


Subjective: 





f/u for lymphadenopathy suspected B-cell lymphoma by flow cytometry but no bx. 

Pt feels weak and tired. Nursing reports persistent hypotension. Receiving 

Midodrine and IVF bolus of 250ml currently. 





- Objective


Vital Signs & Weight: 


 Vital Signs (12 hours)











  Temp


 


 02/02/20 08:00  97.6 F


 


 02/02/20 04:00  96.5 F L


 


 02/02/20 00:00  97.7 F








 Weight











Admit Weight                   145 lb


 


Weight                         141 lb 5.061 oz











 Most Recent Monitor Data











Heart Rate from ECG            56


 


NIBP                           80/48


 


NIBP BP-Mean                   58


 


Respiration from ECG           21


 


SpO2                           92














I&O: 


 











 02/01/20 02/02/20 02/03/20





 06:59 06:59 06:59


 


Intake Total 690 1069 


 


Output Total 150 310 0


 


Balance 540 759 0











Result Diagrams: 


 02/01/20 03:48





 02/01/20 03:48


Additional Labs: 





Microbiology





01/27/20 10:47   Stool   Stool Occult Blood ([facility]) - Final


01/23/20 18:35   Venous blood - Left Hand   Blood Culture - Final


                              NO GROWTH IN 5 DAYS


01/23/20 18:30   Venous blood - Left Arm   Blood Culture - Final


                              NO GROWTH IN 5 DAYS





 Laboratory Tests











  01/26/20 01/29/20 01/30/20





  04:52 04:23 04:24


 


WBC   9.9  10.2


 


Hgb   8.4 L  8.5 L


 


Carbon Dioxide   


 


BUN   


 


Creatinine   


 


B-Natriuretic Peptide   


 


CA 27-29   


 


Procalcitonin   


 


Cortisol  18.00  














  01/30/20 01/31/20 01/31/20





  11:59 01:31 01:31


 


WBC   


 


Hgb   


 


Carbon Dioxide   17 L 


 


BUN   20 


 


Creatinine   1.44 H 


 


B-Natriuretic Peptide    164.9 H


 


CA 27-29  24.1  


 


Procalcitonin   


 


Cortisol   














  01/31/20 02/01/20





  01:32 03:48


 


WBC  13.8 H 


 


Hgb  9.3 L 


 


Carbon Dioxide  


 


BUN  


 


Creatinine  


 


B-Natriuretic Peptide  


 


CA 27-29  


 


Procalcitonin   2.42


 


Cortisol  











EKG Reviewed by me: Yes (Tele - SR)





Hospitalist ROS





- Medication


Medications: 


Active Medications











Generic Name Dose Route Start Last Admin





  Trade Name Freq  PRN Reason Stop Dose Admin


 


Acetaminophen  1,000 mg  01/24/20 03:35  01/31/20 21:06





  Tylenol  PO   1,000 mg





  Q6H PRN   Administration





  Mild Pain (1-3)   





     





     





     


 


Albuterol/Ipratropium  3 ml  01/29/20 11:50  01/31/20 22:02





  Duoneb  NEB   3 ml





  Q4H PRN   Administration





  .WHEEZING   





     





     





     


 


Dexamethasone  4 mg  01/31/20 17:00  02/01/20 17:01





  Decadron  PO   4 mg





  BID-WM TINO   Administration





     





     





     





     


 


Diphenoxylate HCl/Atropine  2 tab  01/28/20 18:09  01/28/20 19:38





  Lomotil  PO   2 tab





  TIDPRN PRN   Administration





  Diarrhea/Loose Stools   





     





     





     


 


Dronedarone  400 mg  01/24/20 17:00  02/01/20 17:01





  Multaq  PO   400 mg





  BID-WM TINO   Administration





     





     





     





     


 


Famotidine  20 mg  01/24/20 09:00  02/01/20 09:32





  Pepcid  PO   20 mg





  QAM TINO   Administration





     





     





     





     


 


Sodium Chloride  250 mls @ 999 mls/hr  01/29/20 11:47  01/31/20 22:50





  Normal Saline 0.9% 250 Ml Bag  IVPB   250 mls





  Q4H PRN   Administration





  SBP <85   





     





     





     


 


Cefepime HCl 1 gm/ Sodium  100 mls @ 200 mls/hr  02/01/20 15:00  02/01/20 16:57





  Chloride  IVPB   100 mls





  1500 TINO   Administration





     





     





     





     


 


Vancomycin HCl 1 gm/ Device  200 mls @ 200 mls/hr  01/31/20 16:00  02/01/20 17:

01





  IVPB   200 mls





  1600 TINO   Administration





     





     





     





     


 


Midodrine  5 mg  01/29/20 21:00  02/01/20 19:57





  Proamatine  PO   5 mg





  BID TINO   Administration





     





     





     





     


 


Sodium Chloride  10 ml  01/24/20 21:00  02/01/20 19:57





  Flush - Normal Saline  IVF   10 ml





  Q12HR TINO   Administration





     





     





     





     


 


Tramadol HCl  50 mg  01/25/20 11:03  01/31/20 12:34





  Ultram  PO   50 mg





  Q6H PRN   Administration





  Moderate Pain (4-6)   





     





     





     














- Exam


General Appearance: ill appearing


General - other findings: frail, pale, nods to questions


Eye: PERRL, anicteric sclera


ENT: normocephalic atraumatic


ENT - other findings: white plaques in oropharynx


Neck: supple


Neck - other findings: + lymphadenopathy


Heart: RRR, no murmur, no gallops, no rubs, normal peripheral pulses


Respiratory: tachypneic


Respiratory - other findings: diminished in bases


Gastrointestinal: soft, non-tender, non-distended, normal bowel sounds


Extremities: no cyanosis, 2+ LE edema


Extremities - other findings: UE edema > than LE edema


Skin: normal turgor


Neurological: no new deficit


Musculoskeletal: generalized weakness


Psychiatric: A&O x 3, lethargic





Hosp A/P


(1) Acute respiratory failure with hypoxia


Code(s): J96.01 - ACUTE RESPIRATORY FAILURE WITH HYPOXIA   Status: Acute   


Plan: 


Multifactorial, continue Cefepime/Vancomycin/Fluconazole








(2) Healthcare-associated pneumonia


Code(s): J18.9 - PNEUMONIA, UNSPECIFIED ORGANISM   Status: Acute   


Plan: 


See above in #1








(3) Acute kidney injury superimposed on CKD


Code(s): N17.9 - ACUTE KIDNEY FAILURE, UNSPECIFIED; N18.9 - CHRONIC KIDNEY 

DISEASE, UNSPECIFIED   Status: Acute   





(4) Lymphadenopathy


Code(s): R59.1 - GENERALIZED ENLARGED LYMPH NODES   Status: Acute   


Plan: 


Suspected B-cell lymphoproliferative disease, Medical oncology follow up, no 

tissue bx to date








(5) Suspected malignant neoplasm


Code(s): R68.89 - OTHER GENERAL SYMPTOMS AND SIGNS   Status: Acute   


Plan: 


See above








(6) Hypotension


Status: Acute   


Plan: 


Multifactorial, continue Midodrine/IVF's, trial of Albumin infusion








(7) Hypoalbuminemia due to protein-calorie malnutrition


Code(s): E46 - UNSPECIFIED PROTEIN-CALORIE MALNUTRITION   Status: Acute   


Plan: 


Albumin infusion today








(8) Macrocytic anemia


Code(s): D53.9 - NUTRITIONAL ANEMIA, UNSPECIFIED   Status: Chronic   





(9) Thrombocytopenia


Code(s): D69.6 - THROMBOCYTOPENIA, UNSPECIFIED   Status: Chronic   


Plan: 


Acute/chronic, monitor trend, avoid NSAIDs, anticoagulation, switch to Protonix

, serial monitoring








- Plan


continue antibiotics, PT/OT, , speech therapy, respiratory 

therapy, DVT proph w/SCDs





Continue supportive mgmt


Continue Cefepime/Vanc/Fluconazole


Continue Midodrine


Await actual tissue bx


PT/OT for mobilization


CM for SNF options


Albumin 25gm IV q6h


Palliative care consult


AM lab: BMP, CBC

## 2020-02-02 NOTE — RAD
Radiograph abdomen one view:



DATE:

2/2/2020



Time:

1:02 PM



HISTORY:

Status post Dobbhoff tube placement in 74-year-old female.



Dr. Amador spoke to Jodi Negron of the CCU by telephone at 1:39 PM on 2/2/2020.

She stated that Dr. Portillo was aware of the position of the Dobbhoff feeding tube, and that it was r
etracted.



FINDINGS:

Distal tip of Dobbhoff feeding tube is obliquely oriented across the left lower lung zone.

Stomach appears decompressed.



IMPRESSION:

Dobbhoff feeding tube deep in a bronchus, probably a branch of left lower lobe bronchus.



Reported By: Hamzah Amador 

Electronically Signed:  2/2/2020 1:42 PM

## 2020-02-02 NOTE — RAD
Radiograph abdomen one view:



DATE:

2/2/2020



Time:

1:11 PM



HISTORY:

Repositioning of Dobbhoff feeding tube.



FINDINGS:

Dobbhoff feeding tube loops over the left upper quadrant along the gastric body and fundus, and is do
ubled back upon itself such that distal tip is near the cardia.



IMPRESSION:

Interval Dobbhoff feeding tube readjustment, coursing through gastric corpus, with distal tip near ga
stric cardia.



Reported By: Hamzah Amador 

Electronically Signed:  2/2/2020 2:11 PM

## 2020-02-03 VITALS — DIASTOLIC BLOOD PRESSURE: 42 MMHG | SYSTOLIC BLOOD PRESSURE: 104 MMHG

## 2020-02-03 LAB
ANION GAP SERPL CALC-SCNC: 22 MMOL/L (ref 10–20)
BUN SERPL-MCNC: 51 MG/DL (ref 9.8–20.1)
CALCIUM SERPL-MCNC: 9.6 MG/DL (ref 7.8–10.44)
CHLORIDE SERPL-SCNC: 107 MMOL/L (ref 98–107)
CO2 SERPL-SCNC: 11 MMOL/L (ref 23–31)
CREAT CL PREDICTED SERPL C-G-VRATE: 22 ML/MIN (ref 70–130)
GLUCOSE SERPL-MCNC: 119 MG/DL (ref 83–110)
HGB BLD-MCNC: 6.7 G/DL (ref 12–16)
MACROCYTES BLD QL SMEAR: (no result) (100X)
MCH RBC QN AUTO: 33.9 PG (ref 27–31)
MCV RBC AUTO: 104 FL (ref 78–98)
MDIFF COMPLETE?: YES
PLATELET # BLD AUTO: 30 THOU/UL (ref 130–400)
POTASSIUM SERPL-SCNC: 4.5 MMOL/L (ref 3.5–5.1)
RBC # BLD AUTO: 1.97 MILL/UL (ref 4.2–5.4)
SODIUM SERPL-SCNC: 135 MMOL/L (ref 136–145)
WBC # BLD AUTO: 20.7 THOU/UL (ref 4.8–10.8)

## 2020-02-03 RX ADMIN — SCOPALAMINE SCH MG: 1 PATCH, EXTENDED RELEASE TRANSDERMAL at 18:06

## 2020-02-03 RX ADMIN — Medication SCH ML: at 20:58

## 2020-02-03 RX ADMIN — SODIUM BICARBONATE SCH MLS: 84 INJECTION, SOLUTION INTRAVENOUS at 08:27

## 2020-02-03 RX ADMIN — Medication SCH ML: at 09:08

## 2020-02-03 RX ADMIN — ALBUMIN HUMAN SCH GM: 250 SOLUTION INTRAVENOUS at 09:07

## 2020-02-03 RX ADMIN — ALBUMIN HUMAN SCH GM: 250 SOLUTION INTRAVENOUS at 02:03

## 2020-02-03 RX ADMIN — SODIUM BICARBONATE SCH MLS: 84 INJECTION, SOLUTION INTRAVENOUS at 23:53

## 2020-02-03 NOTE — PRG
DATE OF SERVICE:  02/03/2020



SUBJECTIVE:  This patient was moved to the ICU over the weekend.  I have reviewed

the notes.  She continues to have declining health despite aggressive treatment. 



OBJECTIVE:  VITAL SIGNS:  On exam, temperature 96.4, pulse 63, blood pressure

109/53, O2 saturations 94%.  Intake 1069, output 310. 

GENERAL:  She looks chronically ill. 

HEENT:  Unremarkable. 

NECK:  No JVD. 

LUNGS:  Coarse breath sounds. 

CARDIOVASCULAR:  S1 and S2.  Regular. 

ABDOMEN:  Soft and nontender. 

EXTREMITIES:  Edematous throughout.



LABORATORY DATA:  White blood cell count 20.7, hematocrit 20.4, platelet count 30,

hemoglobin 6.7.  Sodium 135, potassium 4.5, chloride 107, CO2 of 11, anion gap 18,

BUN 51, creatinine 2.3, glucose 119.  Flow cytometry was consistent with B-cell

lymphoma. 



ASSESSMENT:  

1. B-cell lymphoma.

2. Possibility of underlying breast cancer.

3. Severe protein-calorie malnutrition.

4. Severe metabolic acidosis.



PLAN:  At this point, it seems very unlikely that this patient will improve with any

care.  She likely will never become strong enough to undergo biopsy procedures or

received the chemotherapy that would be necessary to deal with the lymphoma and/or

breast cancer.  I will try to change her IV fluids still with a metabolic acidosis,

but again chances of survival seem quite poor. 







Job ID:  [id number]

## 2020-02-03 NOTE — PDOC.HOSPP
- Subjective


Encounter Date: 02/03/20


Encounter Time: 16:35


Subjective: 





f/u for lymphadenopathy, suspected B-cell lymphoma but no tissue diagnosis for 

confirmation. Remains weak, uncomfortable and lost IV site. Code status DNAR. 





- Objective


Vital Signs & Weight: 


 Vital Signs (12 hours)











  Temp Pulse Pulse Resp BP Pulse Ox


 


 02/03/20 15:07   64   24 H   95


 


 02/03/20 12:00  97 F L     


 


 02/03/20 11:19  97.2 F L   63  17  104/42 L 


 


 02/03/20 11:00  97.6 F   63  19  100/43 L 


 


 02/03/20 08:00  97.3 F L      92 L


 


 02/03/20 06:15       95








 Weight











Admit Weight                   145 lb


 


Weight                         141 lb 5.061 oz











 Most Recent Monitor Data











Heart Rate from ECG            70


 


NIBP                           95/58


 


NIBP BP-Mean                   70


 


Respiration from ECG           22


 


SpO2                           94














I&O: 


 











 02/02/20 02/03/20 02/04/20





 06:59 06:59 06:59


 


Intake Total 1069 691 402


 


Output Total 310 802 660


 


Balance 759 111 -258











Result Diagrams: 


 02/03/20 03:43





 02/03/20 03:43


Additional Labs: 





Microbiology





01/27/20 10:47   Stool   Stool Occult Blood (LETHA) - Final


01/23/20 18:35   Venous blood - Left Hand   Blood Culture - Final


                              NO GROWTH IN 5 DAYS


01/23/20 18:30   Venous blood - Left Arm   Blood Culture - Final


                              NO GROWTH IN 5 DAYS





 Laboratory Tests











  01/26/20 01/29/20 01/30/20





  04:52 04:23 04:24


 


WBC   9.9  10.2


 


Hgb   8.4 L  8.5 L


 


Carbon Dioxide   


 


BUN   


 


Creatinine   


 


B-Natriuretic Peptide   


 


CA 27-29   


 


Procalcitonin   


 


Cortisol  18.00  














  01/30/20 01/31/20 01/31/20





  11:59 01:31 01:31


 


WBC   


 


Hgb   


 


Carbon Dioxide   17 L 


 


BUN   20 


 


Creatinine   1.44 H 


 


B-Natriuretic Peptide    164.9 H


 


CA 27-29  24.1  


 


Procalcitonin   


 


Cortisol   














  01/31/20 02/01/20





  01:32 03:48


 


WBC  13.8 H 


 


Hgb  9.3 L 


 


Carbon Dioxide  


 


BUN  


 


Creatinine  


 


B-Natriuretic Peptide  


 


CA 27-29  


 


Procalcitonin   2.42


 


Cortisol  











Radiology Reviewed by me: Yes (PCXR - increasing density/effusion RLL)


EKG Reviewed by me: Yes (Tele - SR)





Hospitalist ROS





- Medication


Medications: 


Active Medications











Generic Name Dose Route Start Last Admin





  Trade Name Freq  PRN Reason Stop Dose Admin


 


Acetaminophen  1,000 mg  01/24/20 03:35  01/31/20 21:06





  Tylenol  PO   1,000 mg





  Q6H PRN   Administration





  Mild Pain (1-3)   





     





     





     


 


Albuterol/Ipratropium  3 ml  01/29/20 11:50  02/03/20 15:07





  Duoneb  NEB   3 ml





  Q4H PRN   Administration





  .WHEEZING   





     





     





     


 


Diphenoxylate HCl/Atropine  2 tab  01/28/20 18:09  01/28/20 19:38





  Lomotil  PO   2 tab





  TIDPRN PRN   Administration





  Diarrhea/Loose Stools   





     





     





     


 


Dronedarone  400 mg  02/03/20 08:00  02/03/20 09:07





  Multaq  PO   400 mg





  QAM-WM TINO   Administration





     





     





     





     


 


Fluconazole  100 mg  02/03/20 09:00  02/03/20 09:07





  Diflucan  PO   100 mg





  DAILY TINO   Administration





     





     





     





     


 


Hydrocortisone  50 mg  02/02/20 13:00  02/03/20 13:49





  Cortef  PO   50 mg





  Q6H TINO   Administration





     





     





     





     


 


Sodium Chloride  250 mls @ 999 mls/hr  01/29/20 11:47  01/31/20 22:50





  Normal Saline 0.9% 250 Ml Bag  IVPB   250 mls





  Q4H PRN   Administration





  SBP <85   





     





     





     


 


Cefepime HCl 1 gm/ Sodium  100 mls @ 200 mls/hr  02/01/20 15:00  02/03/20 15:16





  Chloride  IVPB   100 mls





  1500 TINO   Administration





     





     





     





     


 


Sodium Bicarbonate 140 meq/  1,140 mls @ 75 mls/hr  02/03/20 08:00  02/03/20 08:

27





  Dextrose/Water  IV   1,140 mls





  [id number] TINO   Administration





     





     





     





     


 


Midodrine  10 mg  02/02/20 15:00  02/03/20 15:34





  Proamatine  PO   10 mg





  TID TINO   Administration





     





     





     





     


 


Pantoprazole Sodium  40 mg  02/02/20 09:00  02/03/20 09:07





  Protonix  IVP   40 mg





  DAILY TINO   Administration





     





     





     





     


 


Sodium Chloride  10 ml  01/24/20 21:00  02/03/20 09:08





  Flush - Normal Saline  IVF   10 ml





  Q12HR TINO   Administration





     





     





     





     


 


Tramadol HCl  50 mg  02/03/20 08:16  02/03/20 15:17





  Ultram  PO   50 mg





  Q12H PRN   Administration





  Moderate Pain (4-6)   





     





     





     














- Exam


General Appearance: ill appearing


General - other findings: pale, moaning


Eye: PERRL, anicteric sclera


ENT: normocephalic atraumatic, no oropharyngeal lesions


Neck: supple, no JVD, no thyromegaly


Neck - other findings: + lymphadenopathy


Heart: RRR, no gallops, no rubs, diminshed peripheral pulses


Respiratory: tachypneic


Respiratory - other findings: coarse sounds bilat, diminished in bases


Extremities - other findings: UE edema bilat


Skin: normal turgor


Neurological - other findings: agitated, moaning


Musculoskeletal: generalized weakness


Psychiatric: somnolent, lethargic





Hosp A/P


(1) Acute respiratory failure with hypoxia


Code(s): J96.01 - ACUTE RESPIRATORY FAILURE WITH HYPOXIA   Status: Acute   


Plan: 


Persistent, continue O2 supplementation, Duonebs








(2) Healthcare-associated pneumonia


Code(s): J18.9 - PNEUMONIA, UNSPECIFIED ORGANISM   Status: Acute   


Plan: 


Appears progressive, continue Cefepime, Duonebs








(3) Acute kidney injury superimposed on CKD


Code(s): N17.9 - ACUTE KIDNEY FAILURE, UNSPECIFIED; N18.9 - CHRONIC KIDNEY 

DISEASE, UNSPECIFIED   Status: Acute   


Plan: 


Worsening, avoid nephrotoxic meds and limit contrast








(4) Lymphadenopathy


Code(s): R59.1 - GENERALIZED ENLARGED LYMPH NODES   Status: Acute   


Plan: 


Likely B-cell lymphoma but no tissue dx to date








(5) Suspected malignant neoplasm


Code(s): R68.89 - OTHER GENERAL SYMPTOMS AND SIGNS   Status: Acute   





(6) Hypotension


Status: Acute   


Plan: 


Persistent, likely end-stage process, continue IVF's, avoid antihypertensives








(7) Hypoalbuminemia due to protein-calorie malnutrition


Code(s): E46 - UNSPECIFIED PROTEIN-CALORIE MALNUTRITION   Status: Acute   





(8) Macrocytic anemia


Code(s): D53.9 - NUTRITIONAL ANEMIA, UNSPECIFIED   Status: Chronic   





(9) Thrombocytopenia


Code(s): D69.6 - THROMBOCYTOPENIA, UNSPECIFIED   Status: Chronic   





- Plan


continue antibiotics, , respiratory therapy, DVT proph w/SCDs





Continue supportive mgmt


Continue Cefepime/Vanc/Fluconazole


Continue Midodrine


Await actual tissue bx


PT/OT for mobilization


CM for SNF options


Albumin 25gm IV q6h


Palliative care consult


Low-dose Lasix 10mg IV x 1 dose


AM lab: BMP, CBC, Mg++, PO3


Code Status: DNAR

## 2020-02-03 NOTE — PDOC.MOPN
Interval History: 





Patient moaning in pain. Events of weekend noted.





- Vital Signs


Vital Signs: 


 Vital Signs (12 hours)











  Temp Pulse Pulse Resp BP Pulse Ox


 


 02/03/20 15:07   64   24 H   95


 


 02/03/20 12:00  97 F L     


 


 02/03/20 11:19  97.2 F L   63  17  104/42 L 


 


 02/03/20 11:00  97.6 F   63  19  100/43 L 


 


 02/03/20 08:00  97.3 F L      92 L


 


 02/03/20 06:15       95


 


 02/03/20 04:00  96.4 F L     








 Weight











Admit Weight                   145 lb


 


Weight                         141 lb 5.061 oz











 Most Recent Monitor Data











Heart Rate from ECG            63


 


NIBP                           105/54


 


NIBP BP-Mean                   71


 


Respiration from ECG           23


 


SpO2                           95

















- Physical Exam


General: Mild distress


Lungs: Clear to auscultation


Cardiovascular: Regular rate


Abdomen: Normal bowel sounds


Extremities: Other


Neurological: Normal speech





- Labs


Result Diagrams: 


 02/03/20 03:43





 02/03/20 03:43


Lab results: 


 Laboratory Results - last 24 hr





02/03/20 08:30: Blood Type O POSITIVE


02/03/20 08:14: Blood Type O POSITIVE, Antibody Screen NEGATIVE, Crossmatch See 

Detail


02/03/20 03:43: WBC 20.7 H, RBC 1.97 L, Hgb 6.7 L, Hct 20.4 L, .0 H, MCH 

33.9 H, MCHC 32.7, RDW 14.5, Plt Count 30 L, MPV 10.7 H, Neutrophils % (Manual) 

35 L, Band Neuts % (Manual) 18 H, Lymphocytes % (Manual) 42, Reactive Lymphs % 2

, Metamyelocytes % (Man) 2 H, Myelocytes % 1 H, Nucleated RBCs # (Man) 5 H, Plt 

Morphology Comment Appears Decreased L, Macrocytosis SLIGHT = 6-15 cells


02/03/20 03:43: Sodium 135 L, Potassium 4.5, Chloride 107, Carbon Dioxide 11 L, 

Anion Gap 22 H, BUN 51 H, Creatinine 2.26 H, Estimated GFR (MDRD) 21, Glucose 

119 H, Calcium 9.6, Phosphorus 4.3


02/03/20 03:43: Magnesium 2.1








Status: lab reviewed by me (flow shows b-cell lymphoproliferative disorder)





A/P





- Problem


(1) Lymphadenopathy


Current Visit: Yes   Code(s): R59.1 - GENERALIZED ENLARGED LYMPH NODES   Status

: Acute   





(2) B-cell abnormality


Current Visit: Yes   Code(s): D72.9 - DISORDER OF WHITE BLOOD CELLS, 

UNSPECIFIED   Status: Acute   





(3) Sepsis with acute organ dysfunction


Current Visit: No   Code(s): A41.9 - SEPSIS, UNSPECIFIED ORGANISM; R65.20 - 

SEVERE SEPSIS WITHOUT SEPTIC SHOCK   Status: Acute   





- Plan


Plan: 





flow cytometry shows b-cell disorder. Need FNA of lymph node and/or bone marrow 

biopsy for final diagnosis


patient very weak and unable to tolerate biopsy nor chemo at this time

## 2020-02-04 LAB
ANION GAP SERPL CALC-SCNC: 17 MMOL/L (ref 10–20)
BUN SERPL-MCNC: 52 MG/DL (ref 9.8–20.1)
CALCIUM SERPL-MCNC: 9.6 MG/DL (ref 7.8–10.44)
CHLORIDE SERPL-SCNC: 104 MMOL/L (ref 98–107)
CO2 SERPL-SCNC: 20 MMOL/L (ref 23–31)
CREAT CL PREDICTED SERPL C-G-VRATE: 23 ML/MIN (ref 70–130)
GLUCOSE SERPL-MCNC: 204 MG/DL (ref 83–110)
HGB BLD-MCNC: 7.7 G/DL (ref 12–16)
MAGNESIUM SERPL-MCNC: 2.3 MG/DL (ref 1.6–2.6)
MCH RBC QN AUTO: 33.6 PG (ref 27–31)
MCV RBC AUTO: 96.3 FL (ref 78–98)
MDIFF COMPLETE?: YES
PLATELET # BLD AUTO: 18 THOU/UL (ref 130–400)
POTASSIUM SERPL-SCNC: 2.9 MMOL/L (ref 3.5–5.1)
RBC # BLD AUTO: 2.3 MILL/UL (ref 4.2–5.4)
REFLEX FOR REVIEW??: YES
SODIUM SERPL-SCNC: 138 MMOL/L (ref 136–145)
TOXIC GRANULES BLD QL SMEAR: SLIGHT
WBC # BLD AUTO: 18 THOU/UL (ref 4.8–10.8)

## 2020-02-04 RX ADMIN — Medication SCH ML: at 09:27

## 2020-02-04 RX ADMIN — Medication SCH ML: at 20:09

## 2020-02-04 RX ADMIN — SODIUM BICARBONATE SCH MLS: 84 INJECTION, SOLUTION INTRAVENOUS at 17:50

## 2020-02-04 NOTE — PRG
DATE OF SERVICE:  02/04/2020



SUBJECTIVE:  This patient continues to struggle.  She cannot verbalize very well.



OBJECTIVE:  VITAL SIGNS:  On exam, temperature is 97.7, pulse 109. And blood

pressure 95/51.  A 24-hour intake 2652, output 2245. 

HEENT:  Bitemporal wasting present.  She has Dobhoff tube. 

NECK:  No JVD. 

LUNGS:  Coarse rhonchi. 

CARDIOVASCULAR:  S1 and S2, irregularly irregular. 

ABDOMEN:  Soft, slightly distended. 

EXTREMITIES:  Edematous with weeping lesions.



LABORATORY DATA:  White blood cell count 18.0, hemoglobin 7.7, hematocrit 22.1, and

platelet count 18.  Sodium 138, potassium 2.9, chloride 104, CO2 of 20, BUN 52,

creatinine 2.2, and glucose 204. 



ASSESSMENT:  

1. The patient is severely debilitated from several underlying problems.  It seems

she probably has some type of B-cell lymphoma that needs tissue diagnosis.

Unfortunately, the patient cannot undergo biopsy procedure because she is so

debilitated. 

2. Severe protein-calorie malnutrition.

3. Improved metabolic acidosis on the bicarb drip.



RECOMMENDATIONS:  

1. She is continuing cefepime and fluconazole for antibiotic coverage.

2. I would hold diuresis for the current time.

3. Continue bicarbonate drip.

4. Continue potassium supplementation.

5. Decrease hydrocortisone dose.

6. Her prognosis is poor.  I do not see any hope of functional recovery and I would

advocate changing care to Palliative. 





Job ID:  [id number]

## 2020-02-04 NOTE — PDOC.PALCO
Palliative Care Consult





- Consult Details


Requesting Physician: Dr Yung


Reason for Consult: goals of care, assistance with communication prognosis/

disease, family support, complex decision-making


Family Members Present: None





- Pertinent HPI





Ms Gleason is a 74 year old female who had an onset of weakness, fatigue, loss 

of appetite over the past 2-3 weeks and presented to the emergency room in 

[place] 1/23 and was to follow up with her primary care. Symptoms 

progressed and be became unable to ambulate, and identified "nodules on her 

fregoso and abdomen.  Returned to emergency room for evaluation and was found to 

have acute on chronic renal disease, elevated troponin, and CT identified 

subdural hematoma as well as diffuse lymphadenopathy.  Admitted for medical 

management and further evaluation. 





- Pertinent PMH





Chronic Kidney disease, History of ischemic colitis, hypertension, HDL, 

physical deconditioning





- Social History


Smoking Status: Current every day smoker


Smoking: cigarettes


Alcohol Use: daily


Drug Use History: none


Living Situation: with partner





- Medications


MAR Reviewed: Yes





- Allergies


Allergies/Adverse Reactions: 


 Allergies











Allergy/AdvReac Type Severity Reaction Status Date / Time


 


ACE Inhibitors Allergy   Verified 01/24/20 02:59














- Subjective





Paitent arousable. minimal ability to communicate, moans when awake. When 

sleeping appears comfortable. Unable to perform adequate ROS secondary to 

lethargy 





- ROS


Non Response: due to mental status


Constitutional: lethargic





- Objective


Vital Signs: 


 Vital Signs - Most Recent











Temp Pulse Resp BP Pulse Ox


 


 97.7 F   83   24 H  104/42 L  93 L


 


 02/04/20 06:26  02/04/20 07:57  02/04/20 07:57  02/03/20 11:19  02/04/20 07:57











Palliative Performance Scale: 30





- Physical Exam


Constitutional: cachectic, ill appearing, moderate distress


HEENT: EOMI, moist MMs, sclera anicteric


Respiratory: labored respirations


Deviation from normal: Adventicious lung sounds bilaterally, weak wet non 

productive cough


Cardiovascular: diminished peripheral pulses, irregular


Gastrointestinal: non-tender, positive bowel sounds, incontinent


Genitourinary: mota catheter


Musculoskeletal: edema present


Deviation from normal: edema greater to right extremities


Skin: fragile, friable


Deviation from normal: encephalopathic





- Problem List


(1) Palliative care encounter


Code(s): Z51.5 - ENCOUNTER FOR PALLIATIVE CARE   Current Visit: Yes   Status: 

Acute   





(2) Physical deconditioning


Code(s): R53.81 - OTHER MALAISE   Current Visit: Yes   Status: Acute   





(3) Acute respiratory failure with hypoxia


Code(s): J96.01 - ACUTE RESPIRATORY FAILURE WITH HYPOXIA   Current Visit: Yes   

Status: Acute   





(4) Atrial fibrillation with RVR


Code(s): I48.91 - UNSPECIFIED ATRIAL FIBRILLATION   Current Visit: Yes   Status

: Acute   





(5) B-cell abnormality


Code(s): D72.9 - DISORDER OF WHITE BLOOD CELLS, UNSPECIFIED   Current Visit: 

Yes   Status: Acute   





(6) Lymphadenopathy


Code(s): R59.1 - GENERALIZED ENLARGED LYMPH NODES   Current Visit: Yes   Status

: Acute   





(7) Suspected malignant neoplasm


Code(s): R68.89 - OTHER GENERAL SYMPTOMS AND SIGNS   Current Visit: Yes   Status

: Acute   





(8) CKD (chronic kidney disease) stage 3, GFR 30-59 ml/min


Code(s): N18.3 - CHRONIC KIDNEY DISEASE, STAGE 3 (MODERATE)   Current Visit: No

   Status: Acute   





- Plan/Recommendations


Plan:


Assessed with BRIAN Dee RN palliative Care who has also been involved with patient 

care.  Will attempt to meet with patient significant other to discuss goals of 

care and patient fragile condition. Oncology unable to biopsy patient at this 

time secondary to fragile state. Will reassess pain in the morning, currently 

with Ultram and although moans with movement is able to rest peacefully when 

not disturbed. 























[] minutes spent on this encounter with >50% of the time in counseling and 

coordination of care.





Thank you for this very appropriate consult.

## 2020-02-04 NOTE — PDOC.HOSPP
- Subjective


Encounter Date: 02/04/20


Encounter Time: 10:35


Subjective: 





f/u for suspected B-cell lymphoma, HCAP on Cefepime/Fluconazole with 

progressive decline, somnolent and weak. 





- Objective


Vital Signs & Weight: 


 Vital Signs (12 hours)











  Temp Pulse Resp Pulse Ox


 


 02/04/20 08:00  97 F L   


 


 02/04/20 07:57   83  24 H  93 L


 


 02/04/20 06:26  97.7 F   


 


 02/04/20 05:40  98.4 F   


 


 02/04/20 04:00  98.7 F   


 


 02/04/20 02:22   69  20  95


 


 02/04/20 00:00  98.9 F   








 Weight











Admit Weight                   145 lb


 


Weight                         141 lb 5.061 oz











 Most Recent Monitor Data











Heart Rate from ECG            85


 


NIBP                           112/55


 


NIBP BP-Mean                   74


 


Respiration from ECG           28


 


SpO2                           94














I&O: 


 











 02/03/20 02/04/20 02/05/20





 06:59 06:59 06:59


 


Intake Total 691 2652 330


 


Output Total 802 2245 1070


 


Balance -111 407 -740











Result Diagrams: 


 02/04/20 03:29





 02/04/20 03:29


Additional Labs: 





Microbiology





01/27/20 10:47   Stool   Stool Occult Blood (LETHA) - Final


01/23/20 18:35   Venous blood - Left Hand   Blood Culture - Final


                              NO GROWTH IN 5 DAYS


01/23/20 18:30   Venous blood - Left Arm   Blood Culture - Final


                              NO GROWTH IN 5 DAYS





 Laboratory Tests











  01/26/20 01/29/20 01/30/20





  04:52 04:23 04:24


 


WBC   9.9  10.2


 


Hgb   8.4 L  8.5 L


 


Carbon Dioxide   


 


BUN   


 


Creatinine   


 


B-Natriuretic Peptide   


 


CA 27-29   


 


Procalcitonin   


 


Cortisol  18.00  














  01/30/20 01/31/20 01/31/20





  11:59 01:31 01:31


 


WBC   


 


Hgb   


 


Carbon Dioxide   17 L 


 


BUN   20 


 


Creatinine   1.44 H 


 


B-Natriuretic Peptide    164.9 H


 


CA 27-29  24.1  


 


Procalcitonin   


 


Cortisol   














  01/31/20 02/01/20





  01:32 03:48


 


WBC  13.8 H 


 


Hgb  9.3 L 


 


Carbon Dioxide  


 


BUN  


 


Creatinine  


 


B-Natriuretic Peptide  


 


CA 27-29  


 


Procalcitonin   2.42


 


Cortisol  











EKG Reviewed by me: Yes (Tele - SR)





Hospitalist ROS





- Medication


Medications: 


Active Medications











Generic Name Dose Route Start Last Admin





  Trade Name Freq  PRN Reason Stop Dose Admin


 


Acetaminophen  1,000 mg  01/24/20 03:35  01/31/20 21:06





  Tylenol  PO   1,000 mg





  Q6H PRN   Administration





  Mild Pain (1-3)   





     





     





     


 


Albuterol/Ipratropium  3 ml  02/03/20 18:30  02/04/20 07:57





  Duoneb  NEB   3 ml





  [id number] TINO   Administration





     





     





     





     


 


Diphenoxylate HCl/Atropine  2 tab  01/28/20 18:09  01/28/20 19:38





  Lomotil  PO   2 tab





  TIDPRN PRN   Administration





  Diarrhea/Loose Stools   





     





     





     


 


Dronedarone  400 mg  02/03/20 08:00  02/04/20 09:23





  Multaq  PO   400 mg





  QAM-WM TINO   Administration





     





     





     





     


 


Fluconazole  100 mg  02/03/20 09:00  02/04/20 09:23





  Diflucan  PO   100 mg





  DAILY TINO   Administration





     





     





     





     


 


Hydrocortisone  50 mg  02/04/20 09:00  02/04/20 09:24





  Cortef  PO   50 mg





  BID TINO   Administration





     





     





     





     


 


Sodium Chloride  250 mls @ 999 mls/hr  01/29/20 11:47  01/31/20 22:50





  Normal Saline 0.9% 250 Ml Bag  IVPB   250 mls





  Q4H PRN   Administration





  SBP <85   





     





     





     


 


Cefepime HCl 1 gm/ Sodium  100 mls @ 200 mls/hr  02/01/20 15:00  02/03/20 15:16





  Chloride  IVPB   100 mls





  1500 TINO   Administration





     





     





     





     


 


Sodium Bicarbonate 140 meq/  1,140 mls @ 75 mls/hr  02/03/20 08:00  02/03/20 23:

53





  Dextrose/Water  IV   1,140 mls





  [id number] TINO   Administration





     





     





     





     


 


Lorazepam  0.5 mg  02/03/20 16:47  02/04/20 06:31





  Ativan  PER TUBE   0.5 mg





  Q4H PRN   Administration





  Anxiety   





     





     





     


 


Midodrine  10 mg  02/02/20 15:00  02/04/20 09:23





  Proamatine  PO   10 mg





  TID TINO   Administration





     





     





     





     


 


Pantoprazole Sodium  40 mg  02/02/20 09:00  02/04/20 09:26





  Protonix  IVP   40 mg





  DAILY TINO   Administration





     





     





     





     


 


Scopolamine  1.5 mg  02/03/20 17:00  02/03/20 18:06





  Transderm Scop  TD   1.5 mg





  Q3D TINO   Administration





     





     





     





     


 


Sodium Chloride  10 ml  01/24/20 21:00  02/04/20 09:27





  Flush - Normal Saline  IVF   10 ml





  Q12HR TINO   Administration





     





     





     





     


 


Tramadol HCl  50 mg  02/03/20 08:16  02/04/20 02:37





  Ultram  PO   50 mg





  Q12H PRN   Administration





  Moderate Pain (4-6)   





     





     





     














- Exam


General Appearance: ill appearing


General - other findings: lethargic, non-verbal


Eye: PERRL


ENT: normocephalic atraumatic, no oropharyngeal lesions


ENT - other findings: Dobhoff feeding tube in place


Neck: supple, symmetric, no JVD


Neck - other findings: + lymphadenopathy


Heart: RRR, no gallops, no rubs, normal peripheral pulses


Respiratory - other findings: diminished in bases, scattered rhonchi


Gastrointestinal: soft, normal bowel sounds, no guarding, no rigidity


Gastrointestinal - other findings: mild distention


Extremities: 2+ LE edema


Neurological - other findings: lethargic, moans, non-verbal


Musculoskeletal: generalized weakness


Psychiatric: somnolent, lethargic





Hosp A/P


(1) Acute respiratory failure with hypoxia


Code(s): J96.01 - ACUTE RESPIRATORY FAILURE WITH HYPOXIA   Status: Acute   


Plan: 


Multifactorial including PNA, COPD, continue Cefepime, add Levaquin, Dulera, 

continue Duonebs, check PCXR in am








(2) Healthcare-associated pneumonia


Code(s): J18.9 - PNEUMONIA, UNSPECIFIED ORGANISM   Status: Acute   


Plan: 


Continue Cefepime, add Levaquin








(3) Acute kidney injury superimposed on CKD


Code(s): N17.9 - ACUTE KIDNEY FAILURE, UNSPECIFIED; N18.9 - CHRONIC KIDNEY 

DISEASE, UNSPECIFIED   Status: Acute   


Plan: 


Persistent, continue low-volume IVF's, avoid nephrotoxic meds and limit 

contrast exposure, serial monitoring








(4) Lymphadenopathy


Code(s): R59.1 - GENERALIZED ENLARGED LYMPH NODES   Status: Acute   


Plan: 


Suspected B-cell lymphoma and appears advanced








(5) Suspected malignant neoplasm


Code(s): R68.89 - OTHER GENERAL SYMPTOMS AND SIGNS   Status: Acute   





(6) Hypotension


Status: Acute   





(7) Hypoalbuminemia due to protein-calorie malnutrition


Code(s): E46 - UNSPECIFIED PROTEIN-CALORIE MALNUTRITION   Status: Acute   





(8) Macrocytic anemia


Code(s): D53.9 - NUTRITIONAL ANEMIA, UNSPECIFIED   Status: Chronic   


Plan: 


Longstanding anemia after review of medical records dating back 7 years, check 

B12/Folate








(9) Thrombocytopenia


Code(s): D69.6 - THROMBOCYTOPENIA, UNSPECIFIED   Status: Chronic   


Plan: 


Hx of thrombocytopenia now worsening, check anti-platelet antibodies, avoid 

iatrogenic influence or Heparin/Lovenox








- Plan


plan discussed w/ family, continue antibiotics, , speech therapy

, respiratory therapy, DVT proph w/SCDs





Continue supportive mgmt


Continue Cefepime/Levaquin/Fluconazole


Continue Midodrine


Await actual tissue bx


PT/OT for mobilization


CM for SNF options


Albumin 25gm IV q6h


Palliative care consult


AM lab: BMP, CBC, Mg++, PO3, TSH, FT4, B12/Folate, Ammonia


Code Status: DNAR


Discussed with family at bedside

## 2020-02-05 LAB
ALBUMIN SERPL BCG-MCNC: 2.8 G/DL (ref 3.4–4.8)
ALP SERPL-CCNC: 272 U/L (ref 40–110)
ALT SERPL W P-5'-P-CCNC: 42 U/L (ref 8–55)
ANION GAP SERPL CALC-SCNC: 17 MMOL/L (ref 10–20)
ANION GAP SERPL CALC-SCNC: 19 MMOL/L (ref 10–20)
ANISOCYTOSIS BLD QL SMEAR: (no result) (100X)
AST SERPL-CCNC: 181 U/L (ref 5–34)
BILIRUB SERPL-MCNC: 1.1 MG/DL (ref 0.2–1.2)
BUN SERPL-MCNC: 55 MG/DL (ref 9.8–20.1)
BUN SERPL-MCNC: 55 MG/DL (ref 9.8–20.1)
CALCIUM SERPL-MCNC: 9.7 MG/DL (ref 7.8–10.44)
CALCIUM SERPL-MCNC: 9.8 MG/DL (ref 7.8–10.44)
CHLORIDE SERPL-SCNC: 102 MMOL/L (ref 98–107)
CHLORIDE SERPL-SCNC: 102 MMOL/L (ref 98–107)
CO2 SERPL-SCNC: 23 MMOL/L (ref 23–31)
CO2 SERPL-SCNC: 24 MMOL/L (ref 23–31)
CREAT CL PREDICTED SERPL C-G-VRATE: 24 ML/MIN (ref 70–130)
CREAT CL PREDICTED SERPL C-G-VRATE: 25 ML/MIN (ref 70–130)
GLOBULIN SER CALC-MCNC: 1.8 G/DL (ref 2.4–3.5)
GLUCOSE SERPL-MCNC: 124 MG/DL (ref 83–110)
GLUCOSE SERPL-MCNC: 148 MG/DL (ref 83–110)
HGB BLD-MCNC: 7.7 G/DL (ref 12–16)
MAGNESIUM SERPL-MCNC: 1.9 MG/DL (ref 1.6–2.6)
MANUAL DIF COMMENT BLD-IMP: (no result)
MCH RBC QN AUTO: 33.2 PG (ref 27–31)
MCV RBC AUTO: 97.2 FL (ref 78–98)
MDIFF COMPLETE?: YES
PLATELET # BLD AUTO: 42 THOU/UL (ref 130–400)
POTASSIUM SERPL-SCNC: 2.6 MMOL/L (ref 3.5–5.1)
POTASSIUM SERPL-SCNC: 3.1 MMOL/L (ref 3.5–5.1)
RBC # BLD AUTO: 2.31 MILL/UL (ref 4.2–5.4)
SODIUM SERPL-SCNC: 140 MMOL/L (ref 136–145)
SODIUM SERPL-SCNC: 141 MMOL/L (ref 136–145)
T4 FREE SERPL-MCNC: 0.45 NG/DL (ref 0.7–1.48)
WBC # BLD AUTO: 18.8 THOU/UL (ref 4.8–10.8)

## 2020-02-05 RX ADMIN — SODIUM BICARBONATE SCH MLS: 84 INJECTION, SOLUTION INTRAVENOUS at 22:23

## 2020-02-05 RX ADMIN — Medication SCH ML: at 10:00

## 2020-02-05 RX ADMIN — MOMETASONE FUROATE AND FORMOTEROL FUMARATE DIHYDRATE SCH: 200; 5 AEROSOL RESPIRATORY (INHALATION) at 18:28

## 2020-02-05 RX ADMIN — SODIUM BICARBONATE SCH MLS: 84 INJECTION, SOLUTION INTRAVENOUS at 07:45

## 2020-02-05 RX ADMIN — MOMETASONE FUROATE AND FORMOTEROL FUMARATE DIHYDRATE SCH PUFF: 200; 5 AEROSOL RESPIRATORY (INHALATION) at 08:03

## 2020-02-05 RX ADMIN — Medication SCH ML: at 20:12

## 2020-02-05 NOTE — PDOC.PALPN
Palliative Progress Note





- Subjective





Patient more lethargic, labored respirations, minimal movement, total assist 

for repositioning. Nutritional support via dobhoff to right nare. Opens eyes 

but non verbal. 








- Objective


Vital Signs: 


 Vital Signs - Most Recent











Temp Pulse Resp BP Pulse Ox


 


 97.3 F L  80   25 H  104/42 L  100 


 


 02/05/20 04:00  02/05/20 07:58  02/05/20 07:58  02/03/20 11:19  02/05/20 07:58














- Physical Exam


Constitutional: confusion, encephalitic, mild distress


HEENT: moist MMs


Respiratory: diminished lung sound, labored respirations


Deviation from normal: adventicious lung sounds bilaterally


Cardiovascular: RRR


Gastrointestinal: non-tender, positive bowel sounds


Genitourinary: mota catheter


Musculoskeletal: edema present, diffuse muscle atrophy


Neurology: moves all 4 limbs


Deviation from normal: lymphadenopathy


Skin: bruising, fragile, friable


Deviation from normal: encephalopathic





- Assessment


(1) Palliative care encounter


Code(s): Z51.5 - ENCOUNTER FOR PALLIATIVE CARE   Current Visit: Yes   Status: 

Acute   





(2) Physical deconditioning


Code(s): R53.81 - OTHER MALAISE   Current Visit: Yes   Status: Acute   





(3) Acute respiratory failure with hypoxia


Code(s): J96.01 - ACUTE RESPIRATORY FAILURE WITH HYPOXIA   Current Visit: Yes   

Status: Acute   





(4) Atrial fibrillation with RVR


Code(s): I48.91 - UNSPECIFIED ATRIAL FIBRILLATION   Current Visit: Yes   Status

: Acute   





(5) B-cell abnormality


Code(s): D72.9 - DISORDER OF WHITE BLOOD CELLS, UNSPECIFIED   Current Visit: 

Yes   Status: Acute   





(6) Lymphadenopathy


Code(s): R59.1 - GENERALIZED ENLARGED LYMPH NODES   Current Visit: Yes   Status

: Acute   





(7) Suspected malignant neoplasm


Code(s): R68.89 - OTHER GENERAL SYMPTOMS AND SIGNS   Current Visit: Yes   Status

: Acute   





(8) CKD (chronic kidney disease) stage 3, GFR 30-59 ml/min


Code(s): N18.3 - CHRONIC KIDNEY DISEASE, STAGE 3 (MODERATE)   Current Visit: No

   Status: Acute   





- Plan


Plan:


Patient has 4 children she is estranged from, her son from [place] came in to 

see her.  As per the sons significant other the patient called him a week ago 

and indicated she was not doing well.  Note worthy is the patient and Juan Carlos 

have been together for 30+year and have not have significant contact with family

, live out on some land and are isolated. Visited with patient son and Juan Carlos (

her significant other and her MPOA) 


Education in relation to disease processes and poor outcomes. Juan Carlos wishes to 

continue with aggressive measures, hopeful for Ms Gleason to return to her 

previous state prior to admission to the hospital. 





*Aggressive measures to treat symptoms 


*Continue to support and educate in relation to guarded prognosis





BRIAN Dee RN Palliative Care to continue to follow, please see notes in note 

section. 




















[60] minutes spent on this encounter with >50% of the time in counseling and 

coordination of care.














- ROS


Non Response: due to mental status

## 2020-02-05 NOTE — PDOC.HOSPP
- Subjective


Encounter Date: 02/05/20


Encounter Time: 14:45


Subjective: 





f/u for suspected B-cell lymphoma, HCAP on Cefepime/Levaquin/Fluconazole. 

Remains lethargic and receiving TF's but increased gastric residual noted per 

nursing. 





- Objective


Vital Signs & Weight: 


 Vital Signs (12 hours)











  Temp Pulse Resp Pulse Ox


 


 02/05/20 18:15   86  16  96


 


 02/05/20 16:00  98.3 F   


 


 02/05/20 15:33   90  22 H  98


 


 02/05/20 12:00  98.0 F   


 


 02/05/20 11:39   87  23 H  99


 


 02/05/20 08:00  98.0 F    97


 


 02/05/20 07:58   80  25 H  100








 Weight











Admit Weight                   145 lb


 


Weight                         141 lb 5.061 oz











 Most Recent Monitor Data











Heart Rate from ECG            89


 


NIBP                           100/61


 


NIBP BP-Mean                   74


 


Respiration from ECG           20


 


SpO2                           96














I&O: 


 











 02/04/20 02/05/20 02/06/20





 06:59 06:59 06:59


 


Intake Total 2652 2784 1319


 


Output Total 2245 2810 585


 


Balance 407 -26 734











Result Diagrams: 


 02/05/20 03:22





 02/05/20 08:50


Additional Labs: 





Microbiology





01/27/20 10:47   Stool   Stool Occult Blood (LETHA) - Final


01/23/20 18:35   Venous blood - Left Hand   Blood Culture - Final


                              NO GROWTH IN 5 DAYS


01/23/20 18:30   Venous blood - Left Arm   Blood Culture - Final


                              NO GROWTH IN 5 DAYS





 Laboratory Tests











  01/26/20 01/29/20 01/30/20





  04:52 04:23 04:24


 


WBC   9.9  10.2


 


Hgb   8.4 L  8.5 L


 


Plt Count   


 


Band Neuts % (Manual)   


 


Carbon Dioxide   


 


BUN   


 


Creatinine   


 


Magnesium   


 


B-Natriuretic Peptide   


 


CA 27-29   


 


Vitamin B12   


 


Folate   


 


Procalcitonin   


 


Free T4   


 


TSH 3rd Generation   


 


Cortisol  18.00  














  01/30/20 01/31/20 01/31/20





  11:59 01:31 01:31


 


WBC   


 


Hgb   


 


Plt Count   


 


Band Neuts % (Manual)   


 


Carbon Dioxide   17 L 


 


BUN   20 


 


Creatinine   1.44 H 


 


Magnesium   


 


B-Natriuretic Peptide    164.9 H


 


CA 27-29  24.1  


 


Vitamin B12   


 


Folate   


 


Procalcitonin   


 


Free T4   


 


TSH 3rd Generation   


 


Cortisol   














  01/31/20 02/01/20 02/04/20





  01:32 03:48 03:29


 


WBC  13.8 H   18.0 H


 


Hgb  9.3 L   7.7 L


 


Plt Count    18 L*


 


Band Neuts % (Manual)    16 H


 


Carbon Dioxide   


 


BUN   


 


Creatinine   


 


Magnesium   


 


B-Natriuretic Peptide   


 


CA 27-29   


 


Vitamin B12   


 


Folate   


 


Procalcitonin   2.42 


 


Free T4   


 


TSH 3rd Generation   


 


Cortisol   














  02/05/20 02/05/20 02/05/20





  03:22 03:22 03:22


 


WBC   


 


Hgb   


 


Plt Count   


 


Band Neuts % (Manual)  27 H  


 


Carbon Dioxide   


 


BUN   


 


Creatinine   


 


Magnesium   


 


B-Natriuretic Peptide   


 


CA 27-29   


 


Vitamin B12   


 


Folate    3.80 L


 


Procalcitonin   


 


Free T4   0.45 L 


 


TSH 3rd Generation   2.0136 


 


Cortisol   














  02/05/20 02/05/20





  03:22 08:50


 


WBC  


 


Hgb  


 


Plt Count  


 


Band Neuts % (Manual)  


 


Carbon Dioxide  


 


BUN  


 


Creatinine  


 


Magnesium   1.9


 


B-Natriuretic Peptide  


 


CA 27-29  


 


Vitamin B12  153 L 


 


Folate  


 


Procalcitonin  


 


Free T4  


 


TSH 3rd Generation  


 


Cortisol  











Radiology Reviewed by me: Yes (PCXR - moderate R pleural effusion)


EKG Reviewed by me: Yes (Tele - SR)





Hospitalist ROS





- Medication


Medications: 


Active Medications











Generic Name Dose Route Start Last Admin





  Trade Name Freq  PRN Reason Stop Dose Admin


 


Acetaminophen  1,000 mg  01/24/20 03:35  01/31/20 21:06





  Tylenol  PO   1,000 mg





  Q6H PRN   Administration





  Mild Pain (1-3)   





     





     





     


 


Albuterol/Ipratropium  3 ml  02/03/20 18:30  02/05/20 18:15





  Duoneb  NEB   3 ml





  P2WM-DF TINO   Administration





     





     





     





     


 


Lipase/Protease/Amylase  1 cap  02/05/20 06:01  02/05/20 06:32





  [place] [id number]  FS   1 cap





  .PER PROTOCOL PRN   Administration





  TUBE OCCLUSION PROTOCOL   





     





     





     


 


Diphenoxylate HCl/Atropine  2 tab  01/28/20 18:09  01/28/20 19:38





  Lomotil  PO   2 tab





  TIDPRN PRN   Administration





  Diarrhea/Loose Stools   





     





     





     


 


Dronedarone  400 mg  02/03/20 08:00  02/05/20 09:58





  Multaq  PO   400 mg





  QAM-WM TINO   Administration





     





     





     





     


 


Fluconazole  100 mg  02/03/20 09:00  02/05/20 09:58





  Diflucan  PO   100 mg





  DAILY TINO   Administration





     





     





     





     


 


Hydrocortisone  50 mg  02/04/20 09:00  02/05/20 09:58





  Cortef  PO   50 mg





  BID TINO   Administration





     





     





     





     


 


Sodium Chloride  250 mls @ 999 mls/hr  01/29/20 11:47  01/31/20 22:50





  Normal Saline 0.9% 250 Ml Bag  IVPB   250 mls





  Q4H PRN   Administration





  SBP <85   





     





     





     


 


Cefepime HCl 1 gm/ Sodium  100 mls @ 200 mls/hr  02/01/20 15:00  02/05/20 15:01





  Chloride  IVPB   100 mls





  1500 TINO   Administration





     





     





     





     


 


Sodium Bicarbonate 140 meq/  1,140 mls @ 75 mls/hr  02/03/20 08:00  02/05/20 07:

45





  Dextrose/Water  IV   1,140 mls





  .O11X60R TINO   Administration





     





     





     





     


 


Levofloxacin 250 mg/ Device  50 mls @ 100 mls/hr  02/04/20 20:00  02/04/20 20:24





  IVPB   50 mls





  Q24HR TINO   Administration





     





     





     





     


 


Lorazepam  0.5 mg  02/04/20 19:51  02/05/20 13:55





  Ativan  PER TUBE   0.5 mg





  Q8H PRN   Administration





  Anxiety   





     





     





     


 


Midodrine  10 mg  02/02/20 15:00  02/05/20 15:02





  Proamatine  PO   10 mg





  TID TINO   Administration





     





     





     





     


 


Mometasone Furoate/Formoterol Fumar  2 puff  02/05/20 06:30  02/05/20 18:28





  Dulera 200 Mcg/5 Mcg Inhaler  INH   Not Given





  BID-RT TINO   





     





     





     





     


 


Morphine Sulfate  4 mg  02/05/20 13:47  02/05/20 15:00





  Morphine  SLOW IVP   4 mg





  Q1H PRN   Administration





  dyspnea   





     





     





     


 


Scopolamine  1.5 mg  02/03/20 17:00  02/03/20 18:06





  Transderm Scop  TD   1.5 mg





  Q3D TINO   Administration





     





     





     





     


 


Sodium Bicarbonate  650 mg  02/05/20 06:01  02/05/20 06:32





  Bicarbonate, Sodium  PER TUBE   650 mg





  .PER PROTOCOL PRN   Administration





  ENTERAL TUBE OCCLUSION   





     





     





     


 


Sodium Chloride  10 ml  01/24/20 21:00  02/05/20 10:00





  Flush - Normal Saline  IVF   10 ml





  Q12HR TINO   Administration





     





     





     





     


 


Tramadol HCl  50 mg  02/03/20 08:16  02/05/20 09:57





  Ultram  PO   50 mg





  Q12H PRN   Administration





  Moderate Pain (4-6)   





     





     





     














- Exam


General Appearance: ill appearing


General - other findings: somnolent, does not respond to voice


ENT: normocephalic atraumatic, no oropharyngeal lesions


ENT - other findings: NGT in nares


Neck: supple, symmetric, no thyromegaly


Neck - other findings: + lymphadenopathy


Heart: RRR, no gallops, no rubs, normal peripheral pulses


Respiratory: tachypneic


Respiratory - other findings: diminished in bases, coarse sounds transmitted 

from upper airway


Gastrointestinal: soft, non-tender, non-distended, normal bowel sounds


Extremities: no cyanosis, 2+ LE edema


Neurological - other findings: unresponsive to voice/stimuli


Musculoskeletal: generalized weakness


Psychiatric: somnolent, lethargic





Hosp A/P


(1) Acute respiratory failure with hypoxia


Code(s): J96.01 - ACUTE RESPIRATORY FAILURE WITH HYPOXIA   Status: Acute   


Plan: 


Persistent, continue pulmonary support, Immanuel Dacosta








(2) Healthcare-associated pneumonia


Code(s): J18.9 - PNEUMONIA, UNSPECIFIED ORGANISM   Status: Acute   


Plan: 


Continue Cefepime/Levaquin, O2 support








(3) Acute kidney injury superimposed on CKD


Code(s): N17.9 - ACUTE KIDNEY FAILURE, UNSPECIFIED; N18.9 - CHRONIC KIDNEY 

DISEASE, UNSPECIFIED   Status: Acute   





(4) Lymphadenopathy


Code(s): R59.1 - GENERALIZED ENLARGED LYMPH NODES   Status: Acute   


Plan: 


Suspected B-cell lymphoma appearing advanced








(5) Suspected malignant neoplasm


Code(s): R68.89 - OTHER GENERAL SYMPTOMS AND SIGNS   Status: Acute   





(6) Hypotension


Status: Acute   


Plan: 


Improved with IVF's








(7) Hypoalbuminemia due to protein-calorie malnutrition


Code(s): E46 - UNSPECIFIED PROTEIN-CALORIE MALNUTRITION   Status: Acute   





(8) Macrocytic anemia


Code(s): D53.9 - NUTRITIONAL ANEMIA, UNSPECIFIED   Status: Chronic   





(9) Thrombocytopenia


Code(s): D69.6 - THROMBOCYTOPENIA, UNSPECIFIED   Status: Chronic   


Plan: 


Mild improvement, acute/chronic condition when reviewing the EMR








- Plan


continue antibiotics, PT/OT, , respiratory therapy, DVT proph w/

SCDs





Continue supportive mgmt


Continue Cefepime/Levaquin/Fluconazole


Continue Midodrine


Await actual tissue bx


PT/OT for mobilization


CM for SNF options


Albumin 25gm IV q6h


Palliative care consult


AM lab: BMP, CBC, Mg++, PO3, TSH, FT4, B12/Folate, Ammonia


Code Status: DNAR


Lasix 10mg IV x 1 dose

## 2020-02-05 NOTE — RAD
SINGLE VIEW OF THE CHEST:

 

COMPARISON: 

2/2/2020.

 

HISTORY: 

Respiratory failure and pneumonia.

 

FINDINGS: 

A single view of the chest shows a normal-size cardiomediastinal silhouette with atherosclerotic calc
ifications in the aorta.  A Dobbhoff tube is seen with its tip in the stomach.  There is a moderate r
ight pleural effusion.  This has increased in size compared to the prior exam.

 

IMPRESSION: 

Moderate right pleural effusion.

 

POS: CET

## 2020-02-05 NOTE — PRG
DATE OF SERVICE:  02/05/2020



SUBJECTIVE:  The patient continues to struggle and does not seem to be getting any

better. 



OBJECTIVE:  VITAL SIGNS:  Temperature 97.3, pulse 82, blood pressure 118/70, O2

saturation 97% on nasal cannula. 

HEENT:  Remarkable for bitemporal wasting. 

NECK:  No JVD. 

LUNGS:  Coarse breath sounds. 

CARDIAC:  S1 and S2.  Regular. 

ABDOMEN:  Soft. 

EXTREMITIES:  Muscle wasting.



LABORATORY DATA:  Sodium 141, potassium 3.1, chloride 102, CO2 of 23, BUN 55,

creatinine 2.0, glucose 124.  White blood cell count 18.8, hematocrit 22.5, platelet

count 42. 



ASSESSMENT:  

1. Continued decline despite aggressive treatment.

2. Probable B-cell lymphoma.

3. Metabolic acidosis.



PLAN:  

1. Move out to [facility].

2. Continue current care.

3. I really think she needs to be on a more palliative plan; however, apparently,

her boyfriend or  is giving us some issues in that regard. 







Job ID:  [id number]

## 2020-02-06 LAB
ALBUMIN SERPL BCG-MCNC: 2.5 G/DL (ref 3.4–4.8)
ALP SERPL-CCNC: 218 U/L (ref 40–110)
ALT SERPL W P-5'-P-CCNC: 35 U/L (ref 8–55)
ANION GAP SERPL CALC-SCNC: 18 MMOL/L (ref 10–20)
AST SERPL-CCNC: 132 U/L (ref 5–34)
BILIRUB SERPL-MCNC: 0.6 MG/DL (ref 0.2–1.2)
BUN SERPL-MCNC: 59 MG/DL (ref 9.8–20.1)
CALCIUM SERPL-MCNC: 9.2 MG/DL (ref 7.8–10.44)
CHLORIDE SERPL-SCNC: 100 MMOL/L (ref 98–107)
CO2 SERPL-SCNC: 28 MMOL/L (ref 23–31)
CREAT CL PREDICTED SERPL C-G-VRATE: 24 ML/MIN (ref 70–130)
GLOBULIN SER CALC-MCNC: 1.9 G/DL (ref 2.4–3.5)
GLUCOSE SERPL-MCNC: 113 MG/DL (ref 83–110)
HGB BLD-MCNC: 7.3 G/DL (ref 12–16)
MCH RBC QN AUTO: 32.6 PG (ref 27–31)
MCV RBC AUTO: 99 FL (ref 78–98)
MDIFF COMPLETE?: YES
PLATELET # BLD AUTO: 21 THOU/UL (ref 130–400)
POTASSIUM SERPL-SCNC: 3.6 MMOL/L (ref 3.5–5.1)
RBC # BLD AUTO: 2.23 MILL/UL (ref 4.2–5.4)
SODIUM SERPL-SCNC: 142 MMOL/L (ref 136–145)
WBC # BLD AUTO: 18.1 THOU/UL (ref 4.8–10.8)

## 2020-02-06 RX ADMIN — MOMETASONE FUROATE AND FORMOTEROL FUMARATE DIHYDRATE SCH: 200; 5 AEROSOL RESPIRATORY (INHALATION) at 08:02

## 2020-02-06 RX ADMIN — SODIUM BICARBONATE SCH MLS: 84 INJECTION, SOLUTION INTRAVENOUS at 15:06

## 2020-02-06 RX ADMIN — Medication SCH ML: at 09:02

## 2020-02-06 RX ADMIN — SCOPALAMINE SCH MG: 1 PATCH, EXTENDED RELEASE TRANSDERMAL at 18:19

## 2020-02-06 RX ADMIN — CYANOCOBALAMIN TAB 1000 MCG SCH MCG: 1000 TAB at 08:33

## 2020-02-06 RX ADMIN — PANTOPRAZOLE SODIUM SCH MG: 40 GRANULE, DELAYED RELEASE ORAL at 08:34

## 2020-02-06 RX ADMIN — MOMETASONE FUROATE AND FORMOTEROL FUMARATE DIHYDRATE SCH: 200; 5 AEROSOL RESPIRATORY (INHALATION) at 19:18

## 2020-02-06 RX ADMIN — Medication SCH TAB: at 08:33

## 2020-02-06 RX ADMIN — Medication SCH ML: at 22:10

## 2020-02-06 NOTE — PDOC.HOSPP
- Subjective


Encounter Date: 02/06/20


Encounter Time: 09:25


Subjective: 





f/u for HCAP on Cefepime/Levaquin/Diflucan but continuing clinical decline. 

Nursing reports pt remains somnolent and received Morphine sulfate IV overnight.





- Objective


Vital Signs & Weight: 


 Vital Signs (12 hours)











  Temp Pulse Resp Pulse Ox


 


 02/06/20 19:27  98.4 F   


 


 02/06/20 18:53   84  17  95


 


 02/06/20 16:18  97.8 F   


 


 02/06/20 14:16   82  12  95


 


 02/06/20 10:47   69  7 L  95


 


 02/06/20 09:00  97.6 F   


 


 02/06/20 08:00   69  11 L  97








 Weight











Admit Weight                   145 lb


 


Weight                         141 lb 5.061 oz











 Most Recent Monitor Data











Heart Rate from ECG            87


 


NIBP                           107/60


 


NIBP BP-Mean                   75


 


Respiration from ECG           22


 


SpO2                           97














I&O: 


 











 02/05/20 02/06/20 02/07/20





 06:59 06:59 06:59


 


Intake Total 2784 2357 180


 


Output Total 2810 817 325


 


Balance -26 1540 -145











Result Diagrams: 


 02/06/20 04:43





 02/06/20 03:42


Additional Labs: 


 Accuchecks











  02/05/20





  20:03


 


POC Glucose  144 H








Microbiology





01/27/20 10:47   Stool   Stool Occult Blood (LETHA) - Final


01/23/20 18:35   Venous blood - Left Hand   Blood Culture - Final


                              NO GROWTH IN 5 DAYS


01/23/20 18:30   Venous blood - Left Arm   Blood Culture - Final


                              NO GROWTH IN 5 DAYS





 Laboratory Tests











  01/26/20 01/29/20 01/30/20





  04:52 04:23 04:24


 


WBC   9.9  10.2


 


Hgb   8.4 L  8.5 L


 


Plt Count   


 


Band Neuts % (Manual)   


 


Carbon Dioxide   


 


BUN   


 


Creatinine   


 


Magnesium   


 


B-Natriuretic Peptide   


 


CA 27-29   


 


Vitamin B12   


 


Folate   


 


Procalcitonin   


 


Free T4   


 


TSH 3rd Generation   


 


Cortisol  18.00  














  01/30/20 01/31/20 01/31/20





  11:59 01:31 01:31


 


WBC   


 


Hgb   


 


Plt Count   


 


Band Neuts % (Manual)   


 


Carbon Dioxide   17 L 


 


BUN   20 


 


Creatinine   1.44 H 


 


Magnesium   


 


B-Natriuretic Peptide    164.9 H


 


CA 27-29  24.1  


 


Vitamin B12   


 


Folate   


 


Procalcitonin   


 


Free T4   


 


TSH 3rd Generation   


 


Cortisol   














  01/31/20 02/01/20 02/04/20





  01:32 03:48 03:29


 


WBC  13.8 H   18.0 H


 


Hgb  9.3 L   7.7 L


 


Plt Count    18 L*


 


Band Neuts % (Manual)    16 H


 


Carbon Dioxide   


 


BUN   


 


Creatinine   


 


Magnesium   


 


B-Natriuretic Peptide   


 


CA 27-29   


 


Vitamin B12   


 


Folate   


 


Procalcitonin   2.42 


 


Free T4   


 


TSH 3rd Generation   


 


Cortisol   














  02/05/20 02/05/20 02/05/20





  03:22 03:22 03:22


 


WBC   


 


Hgb   


 


Plt Count   


 


Band Neuts % (Manual)  27 H  


 


Carbon Dioxide   


 


BUN   


 


Creatinine   


 


Magnesium   


 


B-Natriuretic Peptide   


 


CA 27-29   


 


Vitamin B12   


 


Folate    3.80 L


 


Procalcitonin   


 


Free T4   0.45 L 


 


TSH 3rd Generation   2.0136 


 


Cortisol   














  02/05/20 02/05/20





  03:22 08:50


 


WBC  


 


Hgb  


 


Plt Count  


 


Band Neuts % (Manual)  


 


Carbon Dioxide  


 


BUN  


 


Creatinine  


 


Magnesium   1.9


 


B-Natriuretic Peptide  


 


CA 27-29  


 


Vitamin B12  153 L 


 


Folate  


 


Procalcitonin  


 


Free T4  


 


TSH 3rd Generation  


 


Cortisol  











EKG Reviewed by me: Yes (Tele - SR)





Hospitalist ROS





- Medication


Medications: 


Active Medications











Generic Name Dose Route Start Last Admin





  Trade Name Freq  PRN Reason Stop Dose Admin


 


Acetaminophen  1,000 mg  01/24/20 03:35  01/31/20 21:06





  Tylenol  PO   1,000 mg





  Q6H PRN   Administration





  Mild Pain (1-3)   





     





     





     


 


Albuterol/Ipratropium  3 ml  02/03/20 18:30  02/06/20 18:53





  Duoneb  NEB   3 ml





  O1ES-GI TINO   Administration





     





     





     





     


 


Lipase/Protease/Amylase  1 cap  02/05/20 06:01  02/05/20 06:32





  [place] [id number]  FS   1 cap





  .PER PROTOCOL PRN   Administration





  TUBE OCCLUSION PROTOCOL   





     





     





     


 


Cyanocobalamin  1,000 mcg  02/06/20 09:00  02/06/20 08:33





  Vitamin B-12  PO   1,000 mcg





  DAILY TINO   Administration





     





     





     





     


 


Diphenoxylate HCl/Atropine  2 tab  01/28/20 18:09  01/28/20 19:38





  Lomotil  PO   2 tab





  TIDPRN PRN   Administration





  Diarrhea/Loose Stools   





     





     





     


 


Dronedarone  400 mg  02/03/20 08:00  02/06/20 08:33





  Multaq  PO   400 mg





  QAM-WM TINO   Administration





     





     





     





     


 


Fluconazole  100 mg  02/03/20 09:00  02/06/20 08:33





  Diflucan  PO   100 mg





  DAILY TINO   Administration





     





     





     





     


 


Hydrocortisone  50 mg  02/04/20 09:00  02/06/20 09:01





  Cortef  PO   50 mg





  BID TINO   Administration





     





     





     





     


 


Sodium Chloride  250 mls @ 999 mls/hr  01/29/20 11:47  01/31/20 22:50





  Normal Saline 0.9% 250 Ml Bag  IVPB   250 mls





  Q4H PRN   Administration





  SBP <85   





     





     





     


 


Cefepime HCl 1 gm/ Sodium  100 mls @ 200 mls/hr  02/01/20 15:00  02/06/20 15:06





  Chloride  IVPB   100 mls





  1500 TINO   Administration





     





     





     





     


 


Sodium Bicarbonate 140 meq/  1,140 mls @ 75 mls/hr  02/03/20 08:00  02/06/20 15:

06





  Dextrose/Water  IV   1,140 mls





  .[id number] TINO   Administration





     





     





     





     


 


Levofloxacin 250 mg/ Device  50 mls @ 100 mls/hr  02/04/20 20:00  02/05/20 19:55





  IVPB   50 mls





  Q24HR TINO   Administration





     





     





     





     


 


Levothyroxine Sodium  75 mcg  02/06/20 06:00  02/06/20 05:28





  Synthroid  PO   75 mcg





  0600 TINO   Administration





     





     





     





     


 


Lorazepam  0.5 mg  02/04/20 19:51  02/05/20 13:55





  Ativan  PER TUBE   0.5 mg





  Q8H PRN   Administration





  Anxiety   





     





     





     


 


Midodrine  10 mg  02/02/20 15:00  02/06/20 15:08





  Proamatine  PO   10 mg





  TID TINO   Administration





     





     





     





     


 


Mometasone Furoate/Formoterol Fumar  2 puff  02/05/20 06:30  02/06/20 19:18





  Dulera 200 Mcg/5 Mcg Inhaler  INH   Not Given





  BID-RT TINO   





     





     





     





     


 


Morphine Sulfate  4 mg  02/05/20 13:47  02/05/20 22:20





  Morphine  SLOW IVP   4 mg





  Q1H PRN   Administration





  dyspnea   





     





     





     


 


Pantoprazole Sodium  40 mg  02/06/20 09:00  02/06/20 08:34





  Protonix  PER TUBE   40 mg





  DAILY TINO   Administration





     





     





     





     


 


Scopolamine  1.5 mg  02/03/20 17:00  02/06/20 18:19





  Transderm Scop  TD   1.5 mg





  Q3D TINO   Administration





     





     





     





     


 


Sodium Bicarbonate  650 mg  02/05/20 06:01  02/05/20 06:32





  Bicarbonate, Sodium  PER TUBE   650 mg





  .PER PROTOCOL PRN   Administration





  ENTERAL TUBE OCCLUSION   





     





     





     


 


Sodium Chloride  10 ml  01/24/20 21:00  02/06/20 09:02





  Flush - Normal Saline  IVF   10 ml





  Q12HR TINO   Administration





     





     





     





     


 


Tramadol HCl  50 mg  02/03/20 08:16  02/05/20 09:57





  Ultram  PO   50 mg





  Q12H PRN   Administration





  Moderate Pain (4-6)   





     





     





     


 


Vitamin B Complex/Vit C/Folic Acid  1 tab  02/06/20 09:00  02/06/20 08:33





  Nephro-Lois Tablet  PO   1 tab





  DAILY TINO   Administration





     





     





     





     














- Exam


General Appearance: ill appearing


General - other findings: somnolent, unresponsive


Eye: PERRL


ENT: normocephalic atraumatic, no oropharyngeal lesions


Neck: supple, symmetric, no JVD, no thyromegaly


Heart: RRR, no murmur, no gallops, no rubs, diminshed peripheral pulses


Respiratory - other findings: coarse sounds bilat


Gastrointestinal: soft, non-distended, no palpable masses, no guarding, 

diminished bowl sounds


Extremities: 1+ LE edema


Extremities - other findings: UE edema noted


Skin: normal turgor


Neurological - other findings: somnolent, unresponsive, aphasic


Musculoskeletal: generalized weakness, diffuse muscle atrophy





Hosp A/P


(1) Acute respiratory failure with hypoxia


Code(s): J96.01 - ACUTE RESPIRATORY FAILURE WITH HYPOXIA   Status: Acute   


Plan: 


Persistent with HCAP and R pleural effusion, continue Cefepime/Levaquin/

Fluconazole, Duonebs, Hydrocortisone








(2) Healthcare-associated pneumonia


Code(s): J18.9 - PNEUMONIA, UNSPECIFIED ORGANISM   Status: Acute   


Plan: 


See above, continue IV abx








(3) Acute kidney injury superimposed on CKD


Code(s): N17.9 - ACUTE KIDNEY FAILURE, UNSPECIFIED; N18.9 - CHRONIC KIDNEY 

DISEASE, UNSPECIFIED   Status: Acute   


Plan: 


Persistent, low-volume IVF's








(4) Lymphadenopathy


Code(s): R59.1 - GENERALIZED ENLARGED LYMPH NODES   Status: Acute   


Plan: 


Suspected advanced B-cell lymphoma








(5) Suspected malignant neoplasm


Code(s): R68.89 - OTHER GENERAL SYMPTOMS AND SIGNS   Status: Acute   





(6) Hypotension


Status: Acute   


Plan: 


Persistent, multifactorial, no BP meds, likely end-stage process








(7) Hypoalbuminemia due to protein-calorie malnutrition


Code(s): E46 - UNSPECIFIED PROTEIN-CALORIE MALNUTRITION   Status: Chronic   





(8) Macrocytic anemia


Code(s): D53.9 - NUTRITIONAL ANEMIA, UNSPECIFIED   Status: Chronic   





(9) Thrombocytopenia


Code(s): D69.6 - THROMBOCYTOPENIA, UNSPECIFIED   Status: Chronic   





- Plan


continue antibiotics, , DVT proph w/SCDs





Continue supportive mgmt


Continue Cefepime/Levaquin/Fluconazole


Continue Midodrine


CM for SNF options


Albumin 25gm IV q6h PRN hypotension


Palliative care consult


AM lab: BMP, CBC


Code Status: DNAR


Poor prognosis with clinical deterioration accelerating

## 2020-02-06 NOTE — PRG
DATE OF SERVICE:  02/06/2020



SUBJECTIVE:  The patient appears comfortable and close to expiring.



OBJECTIVE:  VITAL SIGNS:  On exam, temperature is 98.9, pulse 70, and blood pressure

99/53.  Intake 2357, output 802. 

HEENT:  Remarkable for bitemporal wasting. 

NECK:  No abnormalities. 

LUNGS:  Coarse breath sounds bilaterally. 

CARDIOVASCULAR:  S1 and S2.  Regular. 

ABDOMEN:  Soft.



LABORATORY DATA:  Sodium 142, potassium 3.6, chloride 100, CO2 of 28, BUN 59,

creatinine 2.1, and glucose 113.  White blood cell count 18.1, hematocrit 22.1, and

platelet count 21. 



ASSESSMENT:  

1. Severe protein-calorie malnutrition.

2. Failure to thrive.

3. Probable B-cell lymphoma.



PLAN:  We have basically switched to a palliative mode because the patient has no

hope of recovery from this.  She expressed wishes not to have aggressive care in the

event of eventual decline.  At this point, we really have nothing to offer aside

from the comfort measures and I would expect her to pass away shortly. 







Job ID:  [id number]

## 2020-02-07 VITALS — TEMPERATURE: 102 F

## 2020-02-07 RX ADMIN — SODIUM BICARBONATE SCH MLS: 84 INJECTION, SOLUTION INTRAVENOUS at 06:45

## 2020-02-07 RX ADMIN — MOMETASONE FUROATE AND FORMOTEROL FUMARATE DIHYDRATE SCH: 200; 5 AEROSOL RESPIRATORY (INHALATION) at 07:27

## 2020-02-07 RX ADMIN — CYANOCOBALAMIN TAB 1000 MCG SCH MCG: 1000 TAB at 10:28

## 2020-02-07 RX ADMIN — Medication SCH ML: at 10:29

## 2020-02-07 RX ADMIN — Medication SCH TAB: at 10:28

## 2020-02-07 RX ADMIN — PANTOPRAZOLE SODIUM SCH MG: 40 GRANULE, DELAYED RELEASE ORAL at 10:29

## 2020-02-07 NOTE — PRG
DATE OF SERVICE:  02/07/2020



SUBJECTIVE:  Surprisingly, she did wake up to voice this morning.  She cannot

verbalize. 



OBJECTIVE:  VITAL SIGNS:  On exam, temperature is 98.2, pulse 91, blood pressure

91/57, and O2 saturation 94%. 

HEENT:  Remarkable for severe bitemporal wasting.  Oral mucous membranes are dry. 

NECK:  No JVD. 

LUNGS:  Coarse breath sounds. 

CARDIAC:  S1 and S2.  Regular. 

ABDOMEN:  Soft.



ASSESSMENT:  

1. Generalized failure to thrive with severe protein-calorie malnutrition.

2. Probable B-cell lymphoma.



PLAN:  Right now, she is receiving very conservative care in the form of

antibiotics, midodrine to keep her blood pressure up and hydrocortisone.  I doubt

she will improve.  We will keep an eye on her while she is in the [facility]. 







Job ID:  [id number]

## 2020-02-07 NOTE — PDOC.PALPN
Palliative Progress Note





- Subjective





Labored respirations, opens eyes to stimulus but non verbal and 

encephalopathic. 





- Objective


Vital Signs: 


 Vital Signs - Most Recent











Temp Pulse Resp BP Pulse Ox


 


 97.3 F L  88   21 H  104/42 L  91 L


 


 02/07/20 11:48  02/07/20 11:04  02/07/20 11:04  02/03/20 11:19  02/07/20 11:04














- Physical Exam


Constitutional: cachectic, emaciated, encephalitic, ill appearing, moderate 

distress


HEENT: EOMI, moist MMs, sclera anicteric


Deviation from normal: Tearing intermittantly


Respiratory: accessory muscle use, diminished lung sound, labored respirations


Deviation from normal: Apnea greater than 6 seconds, irr respirations


Cardiovascular: RRR


Gastrointestinal: positive bowel sounds, incontinent


Genitourinary: mota catheter


Musculoskeletal: edema present, muscle wasting


Deviation from normal: Nodes palpable right cervical


Skin: bruising, fragile, friable





- Assessment


(1) Palliative care encounter


Code(s): Z51.5 - ENCOUNTER FOR PALLIATIVE CARE   Current Visit: Yes   Status: 

Acute   





(2) Physical deconditioning


Code(s): R53.81 - OTHER MALAISE   Current Visit: Yes   Status: Acute   





(3) Acute respiratory failure with hypoxia


Code(s): J96.01 - ACUTE RESPIRATORY FAILURE WITH HYPOXIA   Current Visit: Yes   

Status: Acute   





(4) Atrial fibrillation with RVR


Code(s): I48.91 - UNSPECIFIED ATRIAL FIBRILLATION   Current Visit: Yes   Status

: Acute   





(5) B-cell abnormality


Code(s): D72.9 - DISORDER OF WHITE BLOOD CELLS, UNSPECIFIED   Current Visit: 

Yes   Status: Acute   





(6) Lymphadenopathy


Code(s): R59.1 - GENERALIZED ENLARGED LYMPH NODES   Current Visit: Yes   Status

: Acute   





(7) Suspected malignant neoplasm


Code(s): R68.89 - OTHER GENERAL SYMPTOMS AND SIGNS   Current Visit: Yes   Status

: Acute   





(8) CKD (chronic kidney disease) stage 3, GFR 30-59 ml/min


Code(s): N18.3 - CHRONIC KIDNEY DISEASE, STAGE 3 (MODERATE)   Current Visit: No

   Status: Acute   





- Plan


Plan:


Rangel to bedside, revisited continued decline and terminal condition. Rangel 

agreed to transition to comfort measures.  Giuseppe RN caring for patient also at 

bedside. 





Added Ativan IVP 1 mg 





Notified Marah Graham and Anival. 




















[45] minutes spent on this encounter with >50% of the time in counseling and 

coordination of care.

## 2020-02-07 NOTE — PDOC.PALPN
Palliative Progress Note





- Subjective





Patient opens eyes, but unable to give verbal response. Labored respirations, 

requires assistance for any movement or ALD's.  Juan Carlos LIZAMA at bedside. 





- Objective


Vital Signs: 


 Vital Signs - Most Recent











Temp Pulse Resp BP Pulse Ox


 


 97.3 F L  88   21 H  104/42 L  91 L


 


 20 11:48  20 11:04  20 11:04  20 11:19  20 11:04














- Physical Exam


Constitutional: cachectic, emaciated, encephalitic, ill appearing, moderate 

distress


HEENT: EOMI, moist MMs, sclera anicteric


Respiratory: accessory muscle use, labored respirations


Cardiovascular: RRR


Gastrointestinal: incontinent


Genitourinary: mota catheter


Musculoskeletal: edema present, muscle wasting


Deviation from normal: palpable nodes 


Skin: bruising, fragile, friable





- Assessment


(1) Palliative care encounter


Code(s): Z51.5 - ENCOUNTER FOR PALLIATIVE CARE   Current Visit: Yes   Status: 

Acute   





(2) Physical deconditioning


Code(s): R53.81 - OTHER MALAISE   Current Visit: Yes   Status: Acute   





(3) Acute respiratory failure with hypoxia


Code(s): J96.01 - ACUTE RESPIRATORY FAILURE WITH HYPOXIA   Current Visit: Yes   

Status: Acute   





(4) Atrial fibrillation with RVR


Code(s): I48.91 - UNSPECIFIED ATRIAL FIBRILLATION   Current Visit: Yes   Status

: Acute   





(5) B-cell abnormality


Code(s): D72.9 - DISORDER OF WHITE BLOOD CELLS, UNSPECIFIED   Current Visit: 

Yes   Status: Acute   





(6) Lymphadenopathy


Code(s): R59.1 - GENERALIZED ENLARGED LYMPH NODES   Current Visit: Yes   Status

: Acute   





(7) Suspected malignant neoplasm


Code(s): R68.89 - OTHER GENERAL SYMPTOMS AND SIGNS   Current Visit: Yes   Status

: Acute   





(8) CKD (chronic kidney disease) stage 3, GFR 30-59 ml/min


Code(s): N18.3 - CHRONIC KIDNEY DISEASE, STAGE 3 (MODERATE)   Current Visit: No

   Status: Acute   





- Plan


Plan: Revisited Goals of care with Juan Carlos LIZAMA, reinforced he is to 

carry forth her wishes.  Extensive education in relation to disease process and 

terminal condition. BRIAN Dee RN Palliative Care also present.  He expressed 

understanding that she was not improving and confirmed that it would not be 

unexpected that she would , however he is not wanting to transition to 

comfort measures.


























[45] minutes spent on this encounter with >50% of the time in counseling and 

coordination of care.














- ROS


Non Response: due to mental status

## 2020-02-08 NOTE — DIS
DATE OF ADMISSION:  2020



DATE OF DISCHARGE:  2020



DATE OF DEATH:  2020.



FINAL DIAGNOSES:  

1. Healthcare-associated pneumonia, organism not identified, suspected gram-negative

rods. 

2. Acute hypoxic respiratory failure, multifactorial.

3. Acute kidney injury on chronic kidney disease stage 3.  

4. Lymphadenopathy with suspected B-cell lymphoma, advanced.  

5. Hypotension.  

6. Severe protein calorie malnutrition.  

7. Chronic macrocytic anemia.  

8. Thrombocytopenia, acute-on-chronic.



CONSULTATIONS:  

1. Dr. Toussaint and Dr. Wallace with Neurosurgical Service, Medical Oncology Service.

2. Dr. Jeff Wise with Cardiology Service.

3. Dr. Jonathan Serra with General Surgery Service.

4. Dr. Portillo and Dr. Florian with Pulmonology Critical Care Service.



PERTINENT LABORATORY AND X-RAY FINDINGS:  Potassium ranged between 2.6 to 4.5,

creatinine ranged between 1.31 to 2.26, estimated GFR ranged between 21 to 40,

lactic acid level 2.7, AST ranged between 36 to 181, ALT ranged between 12-42.

Serum ammonia level 54.  .  Carcinoembryonic antigen 5.18, CA 27-29 24.1.

Vitamin B12 level 153, folate level 3.8, TSH 2.01, free T4 level 0.45.  Serum

cortisol level 18.  CBC showed a white blood cell count ranging between 9.9 to 20.7,

hemoglobin ranged between 7.3 to 9.4, platelet count ranged between 21 to 73.  Her

flow cytometry dated 2020, showed monoclonal B-cells consistent with B-cell

lymphoproliferative disorder.  Blood cultures x2 dated 2020 showed no growth

at 5 days.  Stool Hemoccult dated 2020 positive x1.  Urine culture dated

2020 showed less than 10,000 colonies of gram-negative martita.  CT of the brain

without contrast dated 2020 showed small extra-axial hematoma along the left

frontal convexity.  No evidence of new intracranial hemorrhage.  MRI of the brain

dated 2020 showed no evidence of acute intracranial abnormality.  Nonspecific

focal thickening of the left parietal dura versus small meningioma.  2D

transthoracic echocardiogram dated 2020 showed ejection fraction of greater

than 65%.  Diastolic dysfunction noted.  Portable chest x-ray dated 2020

showed right pleural and parenchymal changes in the right lower chest concerning for

effusion or pneumonia.  Abdominal radiographs dated 2020 showed Dobhoff

feeding tube in the left lower lobe bronchus.  Repeat chest x-ray dated 2020

showed repositioned Dobhoff feeding tube into the distal esophagus.  Right upper

extremity venous Doppler study dated 2020 showed no evidence of thrombus.

Chest x-ray dated 2020 showed moderate right pleural effusion. 



HOSPITAL COURSE:  The patient was initially admitted for generalized weakness, poor

appetite and multiple skin nodules.  The patient was noted with profound weakness

and unquantitated loss of weight, presenting with questionable evidence of subdural

hematoma on CT imaging of the brain.  Neurosurgical consultation was obtained

stating this area was suspicious for potential metastatic process and no specific

evidence of hematoma.  No acute surgical intervention was recommended and patient

was recommended for undergoing evaluation for potential primary carcinoma.  The

patient was also noted with multiple areas of lymphadenopathy on clinical exam,

undergoing evaluation by the primary service in addition to Medical Oncology.  The

patient underwent extensive metabolic workup with flow cytometry performed showing

evidence of B-cell lymphoproliferative disease.  The patient was unable to secure a

tissue biopsy due to ongoing comorbid status, hypotension, and thrombocytopenia.

The patient was treated for suspected healthcare-associated pneumonia with

broad-spectrum IV antibiotic therapy, however, continued to require oxygen

supplementation and declined clinically despite aggressive intervention.  Due to the

patient's severe protein calorie malnutrition, the patient received a feeding tube

for nutritional supplementation.  The patient was evaluated by Dietary Services with

recommendations for ongoing protein supplementation and enteral tube feeds.  Due to

the patient's multiple comorbid conditions and declining status, discussions were

had with Palliative Care Service regarding goals of care.  The patient verbalized

reluctance to pursue any aggressive measures and decided to pursue comfort and

palliative care and converted to do not attempt resuscitation status.  The patient

continued to receive aggressive therapy throughout the entire hospital course;

however despite maximal medical intervention, continued to clinically decompensate.

The patient with likely advanced B-cell lymphoma based on flow cytometry results.

The patient was also noted with advanced respiratory compromise due to

healthcare-associated pneumonia and poor nutritional status.  The family decided to

pursue comfort and palliative measures at which point the patient rapidly clinically

declined and  on 2020 at 16:45 p.m.  Family was notified of patient's

death and decedent affairs were notified. 







Job ID:  [id number]

## 2020-02-18 NOTE — PQF
MIRACLE MARCANO CHARLES DO

[id number]                                                             [facility]-
B04

[id number]                             

                                   

CLINICAL DOCUMENTATION CLARIFICATION FORM:  POST DISCHARGE



Addendum to original discharge summary date:  __________________________________
____



Late entry note date:  _________________________________________________________
__











DATE:  02/18/2020                                              ATTN: ELIAZAR FALCON DO





Please exercise your independent, professional judgment in responding to the 
clarification form. 

Clinical indicators are provided on the bottom of this form for your review





Please check appropriate box(s) to clarify if the following diagnosis has been 
ruled in or ruled out: SEPSIS



[  ] Ruled in diagnosis

     [  ] Continue to treat        [  ] Resolved

[  ] Ruled out diagnosis

[ x ] Cannot rule out diagnosis

[  ] Other diagnosis ___________

[  ] Unable to determine



For continuity of documentation, please document condition throughout progress 
notes and discharge summary.  Thank You.



CLINICAL INDICATORS - SIGNS / SYMPTOMS / LABS

- Sepsis with acute organ dysfunction- Medical oncology PN, 02/03, Bhavya Champion 

- WBC: 138H on 01/31, 18.2H on 02/01, 20.7H on 02/03- Laboratory report

- Temp: 102.0H on 02/07, 97.3L on 02/07- Laboratory report

-Blood culturesx2 dated 02/01 showed no growth at 5 days- DS, 02/07, ELIAZAR FALCON DO



RISK FACTORS

- Healthcare-associated pneumonia- DS, 02/07, ELIAZAR FALCON  DO

- Acute kidney injury-DS, 02/07, ELIAZAR FALCON DO



TREATMENTS

- Cefepime.IV- MAR, 01/31 to 02/07







(This form is maintained as a part of the permanent medical record)

2015 [employer].  All Rights Reserved

[email or web address]    [phone]

                                                              



MTDCHERYLE

## 2020-02-18 NOTE — PQF
MIRACLE MARCANO CHARLES DO

[id number]                                                             [facility]-
B04

[id number]                             

                                   

CLINICAL DOCUMENTATION CLARIFICATION FORM:  POST DISCHARGE



Addendum to original discharge summary date:  __________________________________
____



Late entry note date:  _________________________________________________________
__











DATE:   02/18/2020                                                ATTN: ELIAZAR FALCON  DO





Please exercise your independent, professional judgment in responding to the 
clarification form. 

Clinical indicators are provided on the bottom of this form for your review





Please check appropriate box(s) to clarify if the following diagnosis has been 
ruled in or ruled out:



Type 2 MI



[  ] Ruled in diagnosis

     [  ] Continue to treat        [  ] Resolved

[ x ] Ruled out diagnosis

[  ] Cannot rule out diagnosis

[  ] Other diagnosis ___________

[  ] Unable to determine



For continuity of documentation, please document condition throughout progress 
notes and discharge summary.  Thank You.



CLINICAL INDICATORS - SIGNS / SYMPTOMS / LABS

- Increased troponin likely secondary to demand ischemia, this is a type 2 MI- 
Consultation report, 01/24, Frandy Aguilar MD

- Troponin: 0.048H on 01/31, 0.122H on 01/23- Laboratory report

- Ejection fraction : >65%- Echocardiogram, 01/25

- Elevated troponin- type II- Hospital PN, 01/27, Kaushik Tran MD



RISK FACTORS

- Paroxysmal atrial fibrillation- Consultation report, 01/24, Frandy Aguilar MD

- Hypertension-Norvasc controlled- Hospital PN, 01/27, Kaushik Tran MD



TREATMENTS

- Furosemide.IV- MAR, 02/05

- Aspirin.PO- MAR, 01/28







(This form is maintained as a part of the permanent medical record)



 [id number].  All Rights Reserved

[email or web address]    [phone]

                                                              



MAGGIE

## 2020-02-18 NOTE — PQF
MIRACLE MARCANO CHARLES DO

[id number]                                                             [facility]-
B04

[id number]                             

                                   

CLINICAL DOCUMENTATION CLARIFICATION FORM:  POST DISCHARGE



Addendum to original discharge summary date:  __________________________________
____



Late entry note date:  _________________________________________________________
__











DATE:02/18/2020                                           ATTN:ELIAZAR FALCON DO



Please exercise your independent, professional judgment in responding to the 
clarification form. 

Clinical indicators are provided on the bottom of this form for your review



Please check appropriate box(s):



[ x ] Lymphoma  (Anatomical Site: __chest, neck___________________________)



[  ] Other diagnosis ___________



[  ] Unable to determine



For continuity of documentation, please document condition throughout progress 
notes and discharge summary.  Thank You.





CLINICAL INDICATORS - SIGNS / SYMPTOMS / LABS

- Lymphoma based on flow cytometry- Progress note, 02/02, Bandar Maria MD

- Pachymeningeal enhancement with thickening of the dura and subgaleal spaces 
possible consistent with metastatic process- Progress note, 02/02, Bandar Maria MD

- Lymphadenopathy with suspected B-cell lymphoma,advanced- DS, 02/07, ELIAZAR FALCON DO

- showed monoclonal B-cells consistent with B-cell lymphoproliferative disorder 
-DS, 02/07, ELIAZAR FALCON DO

- the patient was also noted with multiple areas of lymphadenopathy on clinical 
exam-DS, 02/07, ELIAZAR FALCON DO



RISK FACTORS

- B-cell lymphoproliferative disorder -DS, 02/07, ELIAZAR FALCON DO

- Thrombocytopenia, acute on chronic- DS, 02/07, ELIAZAR FALCON DO



TREATMENTS:

- Oncology consult

- Cytometry performed- -DS, 02/07, ELIAZAR FALCON DO

- Morphine.IV- MAR, 02/05









(This form is maintained as a part of the permanent medical record)

2015 [employer].  All Rights Reserved

[email or web address]    [phone]

                                                              



MAGGIE